# Patient Record
Sex: MALE | Race: WHITE | NOT HISPANIC OR LATINO | Employment: OTHER | URBAN - NONMETROPOLITAN AREA
[De-identification: names, ages, dates, MRNs, and addresses within clinical notes are randomized per-mention and may not be internally consistent; named-entity substitution may affect disease eponyms.]

---

## 2017-02-28 ENCOUNTER — COMMUNICATION - GICH (OUTPATIENT)
Dept: FAMILY MEDICINE | Facility: OTHER | Age: 57
End: 2017-02-28

## 2017-02-28 DIAGNOSIS — R21 RASH AND OTHER NONSPECIFIC SKIN ERUPTION: ICD-10-CM

## 2017-04-26 ENCOUNTER — COMMUNICATION - GICH (OUTPATIENT)
Dept: FAMILY MEDICINE | Facility: OTHER | Age: 57
End: 2017-04-26

## 2017-04-26 DIAGNOSIS — H91.90 HEARING LOSS: ICD-10-CM

## 2017-04-27 ENCOUNTER — COMMUNICATION - GICH (OUTPATIENT)
Dept: FAMILY MEDICINE | Facility: OTHER | Age: 57
End: 2017-04-27

## 2017-04-27 DIAGNOSIS — H91.90 HEARING LOSS: ICD-10-CM

## 2017-06-20 ENCOUNTER — OFFICE VISIT - GICH (OUTPATIENT)
Dept: OTOLARYNGOLOGY | Facility: OTHER | Age: 57
End: 2017-06-20

## 2017-06-20 DIAGNOSIS — Z00.00 ENCOUNTER FOR GENERAL ADULT MEDICAL EXAMINATION WITHOUT ABNORMAL FINDINGS: ICD-10-CM

## 2017-07-17 ENCOUNTER — HISTORY (OUTPATIENT)
Dept: FAMILY MEDICINE | Facility: OTHER | Age: 57
End: 2017-07-17

## 2017-07-17 ENCOUNTER — OFFICE VISIT - GICH (OUTPATIENT)
Dept: FAMILY MEDICINE | Facility: OTHER | Age: 57
End: 2017-07-17

## 2017-07-17 DIAGNOSIS — E78.5 HYPERLIPIDEMIA: ICD-10-CM

## 2017-07-17 DIAGNOSIS — Z00.00 ENCOUNTER FOR GENERAL ADULT MEDICAL EXAMINATION WITHOUT ABNORMAL FINDINGS: ICD-10-CM

## 2017-07-17 DIAGNOSIS — R07.89 OTHER CHEST PAIN: ICD-10-CM

## 2017-07-17 DIAGNOSIS — Z23 ENCOUNTER FOR IMMUNIZATION: ICD-10-CM

## 2017-07-17 DIAGNOSIS — H90.6 MIXED CONDUCTIVE AND SENSORINEURAL HEARING LOSS OF BOTH EARS: ICD-10-CM

## 2017-07-17 LAB
ABSOLUTE BASOPHILS - HISTORICAL: 0.1 THOU/CU MM
ABSOLUTE EOSINOPHILS - HISTORICAL: 0.3 THOU/CU MM
ABSOLUTE IMMATURE GRANULOCYTES(METAS,MYELOS,PROS) - HISTORICAL: 0 THOU/CU MM
ABSOLUTE LYMPHOCYTES - HISTORICAL: 2.5 THOU/CU MM (ref 0.9–2.9)
ABSOLUTE MONOCYTES - HISTORICAL: 0.6 THOU/CU MM
ABSOLUTE NEUTROPHILS - HISTORICAL: 4.8 THOU/CU MM (ref 1.7–7)
ANION GAP - HISTORICAL: 11 (ref 5–18)
BASOPHILS # BLD AUTO: 0.7 %
BILIRUB UR QL: NEGATIVE
BUN SERPL-MCNC: 17 MG/DL (ref 7–25)
BUN/CREAT RATIO - HISTORICAL: 17
CALCIUM SERPL-MCNC: 9.2 MG/DL (ref 8.6–10.3)
CHLORIDE SERPLBLD-SCNC: 102 MMOL/L (ref 98–107)
CLARITY, URINE: CLEAR CLARITY
CO2 SERPL-SCNC: 26 MMOL/L (ref 21–31)
COLOR UR: YELLOW COLOR
CREAT SERPL-MCNC: 1.01 MG/DL (ref 0.7–1.3)
EOSINOPHIL NFR BLD AUTO: 3.6 %
ERYTHROCYTE [DISTWIDTH] IN BLOOD BY AUTOMATED COUNT: 13.2 % (ref 11.5–15.5)
GFR IF NOT AFRICAN AMERICAN - HISTORICAL: >60 ML/MIN/1.73M2
GLUCOSE SERPL-MCNC: 91 MG/DL (ref 70–105)
GLUCOSE URINE: NEGATIVE MG/DL
HCT VFR BLD AUTO: 41.1 % (ref 37–53)
HEMOGLOBIN: 13.9 G/DL (ref 13.5–17.5)
IMMATURE GRANULOCYTES(METAS,MYELOS,PROS) - HISTORICAL: 0.5 %
KETONES UR QL: NEGATIVE MG/DL
LEUKOCYTE ESTERASE URINE: NEGATIVE
LYMPHOCYTES NFR BLD AUTO: 30.2 % (ref 20–44)
MCH RBC QN AUTO: 30.4 PG (ref 26–34)
MCHC RBC AUTO-ENTMCNC: 33.8 G/DL (ref 32–36)
MCV RBC AUTO: 90 FL (ref 80–100)
MONOCYTES NFR BLD AUTO: 6.7 %
NEUTROPHILS NFR BLD AUTO: 58.3 % (ref 42–72)
NITRITE UR QL STRIP: NEGATIVE
OCCULT BLOOD,URINE - HISTORICAL: NEGATIVE
PH UR: 6.5 [PH]
PLATELET # BLD AUTO: 289 THOU/CU MM (ref 140–440)
PMV BLD: 9.7 FL (ref 6.5–11)
POTASSIUM SERPL-SCNC: 3.8 MMOL/L (ref 3.5–5.1)
PROTEIN QUALITATIVE,URINE - HISTORICAL: NEGATIVE MG/DL
RED BLOOD COUNT - HISTORICAL: 4.57 MIL/CU MM (ref 4.3–5.9)
SODIUM SERPL-SCNC: 139 MMOL/L (ref 133–143)
SP GR UR STRIP: 1.02
UROBILINOGEN,QUALITATIVE - HISTORICAL: NORMAL EU/DL
WHITE BLOOD COUNT - HISTORICAL: 8.2 THOU/CU MM (ref 4.5–11)

## 2017-08-08 ENCOUNTER — AMBULATORY - GICH (OUTPATIENT)
Dept: SCHEDULING | Facility: OTHER | Age: 57
End: 2017-08-08

## 2017-08-08 ENCOUNTER — OFFICE VISIT - GICH (OUTPATIENT)
Dept: OTOLARYNGOLOGY | Facility: OTHER | Age: 57
End: 2017-08-08

## 2017-08-08 DIAGNOSIS — Z00.00 ENCOUNTER FOR GENERAL ADULT MEDICAL EXAMINATION WITHOUT ABNORMAL FINDINGS: ICD-10-CM

## 2017-12-28 NOTE — PROGRESS NOTES
Patient Information     Patient Name MRN Sex Ellis Molina [id number] Male 1960      Progress Notes by Selwyn Jacinto MD at 2017  3:45 PM     Author:  Selwyn Jacinto MD Service:  (none) Author Type:  Physician     Filed:  2017  4:53 PM Encounter Date:  2017 Status:  Signed     :  Selwyn Jacinto MD (Physician)            ----------------- PREOPERATIVE EXAM ------------------  2017    SUBJECTIVE:  Ellis Scott is a 56 y.o. male here for preoperative optimization.    I was asked to see Ellis Scott by Dr. Kauffman for  preoperative evaluation due to hyperlipidemia and a history of atypical chest pain.    Date of Surgery: 2017  Type of Surgery: Left her surgery  Surgeon: Dr. Kauffman  Hospital:  [facility]    HPI:  Patient is 56-year-old man who is having ear surgery because of hearing loss. He's been feeling fine. He had a history of atypical chest pain 2 years ago, and workup was negative. He's had no problems at all since then.    He gets lipids checked through his work and for the last 2 years his cholesterol is been under 200.    He feels well and has no new problems or concerns.      Fever/Chills or other infectious symptoms in past month:  (NO)   >10lb weight loss in past two months:  (NO)     Health Care Directive/Code status: Full code status  Hx of blood transfusions:   (NO)   History of VRE/MRSA:  (NO)     Preoperative Evaluation: Obstructive Sleep Apnea screening    S: Snore -  Do you snore loudly? (louder than talking or loud enough to be heard through closed doors)(NO)  T: Tired - Do you often feel tired, fatigued, or sleepy during the daytime?(NO)  O: Observed - Has anyone ever observed you stop breathing during your sleep?(NO)  P: Pressure - Do you have or are you being treated for high blood pressure?(NO)  B: BMI - BMI greater than 35kg/m2?(NO)  A: Age - Age over 50 years old?(YES)  N: Neck - Neck circumference greater than 40  "cm?(NO)  G: Gender - Gender: Male?(YES)    Total number of \"YES\" responses:  2    Scoring: Low risk of OLIMPIA 0-2  At Risk of OLIMPIA: >3 High Risk of OLIMPIA: 5-8        Patient Active Problem List       Diagnosis  Date Noted     Mixed conductive and sensorineural hearing loss, bilateral  05/15/2017     Wheezing  01/28/2013     HYPERLIPIDEMIA       ECZEMA       Chronic          OCULAR MIGRAINES, HX OF       VARICOSE VEIN       Right Leg            Past Medical History:     Diagnosis  Date     Decreased hearing      H/O vasectomy      History of hyperlipidemia      Probable BET        Past Surgical History:      Procedure  Laterality Date     COLONOSCOPY  2009    normal--10 year followup       INNER EAR SURGERY       x several        Umbilical hernia surgery       VASECTOMY         Current Outpatient Prescriptions       Medication  Sig Dispense Refill     triamcinolone (ARISTOCORT) 0.1 % ointment Apply  topically to affected area(s) 4 times daily. 80 g 3     No current facility-administered medications for this visit.      Medications have been reviewed by me and are current to the best of my knowledge and ability.    Recent use of: no recent use of aspirin (ASA), NSAIDS or steroids    Allergies:  No Known Allergies  Latex allergy  no  Immunizations:  Immunization History     Administered  Date(s) Administered     Td (Age >=7 Years) 09/04/2001     Tdap 07/17/2017       Family History       Problem   Relation Age of Onset     Heart Disease  Mother      Other  Other      Parkinson's disease       Other  Other      Parkinson's disease       Heart Disease  Brother      5 vessel bypass       Cancer-prostate  Other        Denies family hx of bleeding tendencies, anesthesia complications, or other problems with surgery.    Social History        Substance Use Topics          Smoking status:   Former Smoker      Types:  Cigarettes, Cigars      Quit date:  9/11/2014      Smokeless tobacco:   Never Used       Comment: Only smokes during " deer season and has 1-2 cigars per year       Alcohol use   1.2 oz/week     1 Cans of beer, 1 Shots of liquor per week         ROS:    Surgical:  patient denies previous complications from prior surgeries including but not limited to prolonged bleeding, anesthesia complications, dysrhythmias, surgical wound infections, or prolonged hospital stay.  Cardiorespiratory: denies chest pain, palpitations, shortness of breath, cough. Most exertion in the past 2 weeks was greater than 4 METs.  Complete ROS otherwise negative except as noted in HPI.     -------------------------------------------------------------    PHYSICAL EXAM:  /80  Pulse 76  Temp 98.4  F (36.9  C) (Temporal)  Ht 1.829 m (6')  Wt 98.9 kg (218 lb)  SpO2 98%  BMI 29.57 kg/m2    EXAM:  General Appearance: Pleasant, alert, appropriate appearance for age. No acute distress  Head Exam: Normal. Normocephalic, atraumatic.  Eyes: PERRL, EOMI  Ears: Normal TM's bilaterally.   OroPharynx: Mucosa pink and moist. Dentition in good repair. No dentures  Neck: Supple, no masses or nodes, no lymphadenopathy.  No thyromegaly.  Lungs: Normal chest wall and respirations. Clear to auscultation, no wheezes or crackles.  Cardiovascular: Regular rate and rhythm. S1, S2, no murmurs.  Gastrointestinal: Soft, nontender, no abnormal masses or organomegaly. BS normal    exam reveals normal penis and testicles, no hernia. Rectal exam reveals normal sphincter tone, empty rectal vault, normal size prostate with no nodules, tenderness, or asymmetry.  Musculoskeletal: No edema. No warm or erythematous joints.  Skin: no concerning or new rashes.  Neurologic Exam: CN 2-12 grossly intact.  Normal gait.  Symmetric DTRs, normal gross motor movement, tone, and coordination. No tremor.  Psychiatric Exam: Alert and oriented, appropriate affect.      EKG:  Personally reviewed by me, done today, and is entirely normal. CBC and basic metabolic panel are  pending  ---------------------------------------------------------------    ASSESSEMENT AND PLAN: Normal preoperative exam. He's had no recurrence of atypical chest pain for 2 years. No contraindication is found to proceeding with his surgery.    Ellis was seen today for preoperative exam.    Diagnoses and all orders for this visit:    Need for diphtheria-tetanus-pertussis (Tdap) vaccine, adult/adolescent  -     OMNI TDAP VACCINE IM  -     OK ADMIN VACC INITIAL    Hyperlipidemia, unspecified hyperlipidemia type  -     URINALYSIS W REFLEX MICROSCOPIC IF POSITIVE; Future  -     CBC WITH DIFFERENTIAL; Future  -     BASIC METABOLIC PANEL; Future  -     URINALYSIS W REFLEX MICROSCOPIC IF POSITIVE  -     CBC WITH DIFFERENTIAL  -     BASIC METABOLIC PANEL  -     CBC WITH AUTO DIFFERENTIAL    Mixed conductive and sensorineural hearing loss, bilateral  -     URINALYSIS W REFLEX MICROSCOPIC IF POSITIVE; Future  -     URINALYSIS W REFLEX MICROSCOPIC IF POSITIVE    PE (physical exam), routine  -     URINALYSIS W REFLEX MICROSCOPIC IF POSITIVE; Future  -     URINALYSIS W REFLEX MICROSCOPIC IF POSITIVE    Atypical chest pain  -     EKG 12 LEAD UNIT PERFORMED  -     CBC WITH DIFFERENTIAL; Future  -     BASIC METABOLIC PANEL; Future  -     CBC WITH DIFFERENTIAL  -     BASIC METABOLIC PANEL  -     CBC WITH AUTO DIFFERENTIAL        PRE OP RECOMMENDATIONS:    No family history of problems with bleeding or anesthesia. Patient is able to tolerate greater than 4 METs of activity without any cardiopulmonary symptoms. ASA PS class 1 . No cardiopulmonary workup is neccessary for the current procedure. Please contact the office with any questions or concerns.    Patient is on chronic pain medications (NO);   Patient is on antiplatlet/anticoagulation (NO)  Other medications that need adjustment perioperatively (NO)    Other:  Patient was advised to call our office and the surgical services with any change in condition or new symptoms if they  were to develop between today and their surgical date; especially any cardiopulmonary symptoms or symptoms concerning for an infection.    Recommendations: Patient may proceed with surgery as scheduled, pending the results of his lab work which I expect will be entirely normal.  Selwyn Jacinto MD ....................  7/17/2017   4:52 PM

## 2017-12-28 NOTE — PROGRESS NOTES
Patient Information     Patient Name MRN Ellis Scott [id number] Male 1960      Progress Notes by Eda Bell at 2017  2:20 PM     Author:  Eda Bell Service:  (none) Author Type:  (none)     Filed:  2017  3:49 PM Encounter Date:  2017 Status:  Signed     :  Eda Bell            See scanned document.

## 2017-12-28 NOTE — PROGRESS NOTES
Patient Information     Patient Name MRN Ellis Scott [id number] Male 1960      Progress Notes by Eda Bell at 2017 11:10 AM     Author:  Eda Bell Service:  (none) Author Type:  (none)     Filed:  2017 12:57 PM Encounter Date:  2017 Status:  Signed     :  Eda Bell            See scanned document.

## 2017-12-29 NOTE — H&P
Patient Information     Patient Name MRN Sex Ellis Molina [id number] Male 1960      H&P by Selwyn Jacinto MD at 2017  3:45 PM     Author:  Selwyn Jacinto MD Service:  (none) Author Type:  Physician     Filed:  2017  4:53 PM Encounter Date:  2017 Status:  Signed     :  Selwyn Jacinto MD (Physician)            ----------------- PREOPERATIVE EXAM ------------------  2017    SUBJECTIVE:  Ellis Scott is a 56 y.o. male here for preoperative optimization.    I was asked to see Ellis Scott by Dr. Kauffman for  preoperative evaluation due to hyperlipidemia and a history of atypical chest pain.    Date of Surgery: 2017  Type of Surgery: Left her surgery  Surgeon: Dr. Kauffman  Hospital:  [facility]    HPI:  Patient is 56-year-old man who is having ear surgery because of hearing loss. He's been feeling fine. He had a history of atypical chest pain 2 years ago, and workup was negative. He's had no problems at all since then.    He gets lipids checked through his work and for the last 2 years his cholesterol is been under 200.    He feels well and has no new problems or concerns.      Fever/Chills or other infectious symptoms in past month:  (NO)   >10lb weight loss in past two months:  (NO)     Health Care Directive/Code status: Full code status  Hx of blood transfusions:   (NO)   History of VRE/MRSA:  (NO)     Preoperative Evaluation: Obstructive Sleep Apnea screening    S: Snore -  Do you snore loudly? (louder than talking or loud enough to be heard through closed doors)(NO)  T: Tired - Do you often feel tired, fatigued, or sleepy during the daytime?(NO)  O: Observed - Has anyone ever observed you stop breathing during your sleep?(NO)  P: Pressure - Do you have or are you being treated for high blood pressure?(NO)  B: BMI - BMI greater than 35kg/m2?(NO)  A: Age - Age over 50 years old?(YES)  N: Neck - Neck circumference greater than 40  "cm?(NO)  G: Gender - Gender: Male?(YES)    Total number of \"YES\" responses:  2    Scoring: Low risk of OLIMPIA 0-2  At Risk of OLIMPIA: >3 High Risk of OLIMPIA: 5-8        Patient Active Problem List       Diagnosis  Date Noted     Mixed conductive and sensorineural hearing loss, bilateral  05/15/2017     Wheezing  01/28/2013     HYPERLIPIDEMIA       ECZEMA       Chronic          OCULAR MIGRAINES, HX OF       VARICOSE VEIN       Right Leg            Past Medical History:     Diagnosis  Date     Decreased hearing      H/O vasectomy      History of hyperlipidemia      Probable BET        Past Surgical History:      Procedure  Laterality Date     COLONOSCOPY  2009    normal--10 year followup       INNER EAR SURGERY       x several        Umbilical hernia surgery       VASECTOMY         Current Outpatient Prescriptions       Medication  Sig Dispense Refill     triamcinolone (ARISTOCORT) 0.1 % ointment Apply  topically to affected area(s) 4 times daily. 80 g 3     No current facility-administered medications for this visit.      Medications have been reviewed by me and are current to the best of my knowledge and ability.    Recent use of: no recent use of aspirin (ASA), NSAIDS or steroids    Allergies:  No Known Allergies  Latex allergy  no  Immunizations:  Immunization History     Administered  Date(s) Administered     Td (Age >=7 Years) 09/04/2001     Tdap 07/17/2017       Family History       Problem   Relation Age of Onset     Heart Disease  Mother      Other  Other      Parkinson's disease       Other  Other      Parkinson's disease       Heart Disease  Brother      5 vessel bypass       Cancer-prostate  Other        Denies family hx of bleeding tendencies, anesthesia complications, or other problems with surgery.    Social History        Substance Use Topics          Smoking status:   Former Smoker      Types:  Cigarettes, Cigars      Quit date:  9/11/2014      Smokeless tobacco:   Never Used       Comment: Only smokes during " deer season and has 1-2 cigars per year       Alcohol use   1.2 oz/week     1 Cans of beer, 1 Shots of liquor per week         ROS:    Surgical:  patient denies previous complications from prior surgeries including but not limited to prolonged bleeding, anesthesia complications, dysrhythmias, surgical wound infections, or prolonged hospital stay.  Cardiorespiratory: denies chest pain, palpitations, shortness of breath, cough. Most exertion in the past 2 weeks was greater than 4 METs.  Complete ROS otherwise negative except as noted in HPI.     -------------------------------------------------------------    PHYSICAL EXAM:  /80  Pulse 76  Temp 98.4  F (36.9  C) (Temporal)  Ht 1.829 m (6')  Wt 98.9 kg (218 lb)  SpO2 98%  BMI 29.57 kg/m2    EXAM:  General Appearance: Pleasant, alert, appropriate appearance for age. No acute distress  Head Exam: Normal. Normocephalic, atraumatic.  Eyes: PERRL, EOMI  Ears: Normal TM's bilaterally.   OroPharynx: Mucosa pink and moist. Dentition in good repair. No dentures  Neck: Supple, no masses or nodes, no lymphadenopathy.  No thyromegaly.  Lungs: Normal chest wall and respirations. Clear to auscultation, no wheezes or crackles.  Cardiovascular: Regular rate and rhythm. S1, S2, no murmurs.  Gastrointestinal: Soft, nontender, no abnormal masses or organomegaly. BS normal    exam reveals normal penis and testicles, no hernia. Rectal exam reveals normal sphincter tone, empty rectal vault, normal size prostate with no nodules, tenderness, or asymmetry.  Musculoskeletal: No edema. No warm or erythematous joints.  Skin: no concerning or new rashes.  Neurologic Exam: CN 2-12 grossly intact.  Normal gait.  Symmetric DTRs, normal gross motor movement, tone, and coordination. No tremor.  Psychiatric Exam: Alert and oriented, appropriate affect.      EKG:  Personally reviewed by me, done today, and is entirely normal. CBC and basic metabolic panel are  pending  ---------------------------------------------------------------    ASSESSEMENT AND PLAN: Normal preoperative exam. He's had no recurrence of atypical chest pain for 2 years. No contraindication is found to proceeding with his surgery.    Ellis was seen today for preoperative exam.    Diagnoses and all orders for this visit:    Need for diphtheria-tetanus-pertussis (Tdap) vaccine, adult/adolescent  -     OMNI TDAP VACCINE IM  -     [place] ADMIN VACC INITIAL    Hyperlipidemia, unspecified hyperlipidemia type  -     URINALYSIS W REFLEX MICROSCOPIC IF POSITIVE; Future  -     CBC WITH DIFFERENTIAL; Future  -     BASIC METABOLIC PANEL; Future  -     URINALYSIS W REFLEX MICROSCOPIC IF POSITIVE  -     CBC WITH DIFFERENTIAL  -     BASIC METABOLIC PANEL  -     CBC WITH AUTO DIFFERENTIAL    Mixed conductive and sensorineural hearing loss, bilateral  -     URINALYSIS W REFLEX MICROSCOPIC IF POSITIVE; Future  -     URINALYSIS W REFLEX MICROSCOPIC IF POSITIVE    PE (physical exam), routine  -     URINALYSIS W REFLEX MICROSCOPIC IF POSITIVE; Future  -     URINALYSIS W REFLEX MICROSCOPIC IF POSITIVE    Atypical chest pain  -     EKG 12 LEAD UNIT PERFORMED  -     CBC WITH DIFFERENTIAL; Future  -     BASIC METABOLIC PANEL; Future  -     CBC WITH DIFFERENTIAL  -     BASIC METABOLIC PANEL  -     CBC WITH AUTO DIFFERENTIAL        PRE OP RECOMMENDATIONS:    No family history of problems with bleeding or anesthesia. Patient is able to tolerate greater than 4 METs of activity without any cardiopulmonary symptoms. ASA PS class 1 . No cardiopulmonary workup is neccessary for the current procedure. Please contact the office with any questions or concerns.    Patient is on chronic pain medications (NO);   Patient is on antiplatlet/anticoagulation (NO)  Other medications that need adjustment perioperatively (NO)    Other:  Patient was advised to call our office and the surgical services with any change in condition or new symptoms if they  were to develop between today and their surgical date; especially any cardiopulmonary symptoms or symptoms concerning for an infection.    Recommendations: Patient may proceed with surgery as scheduled, pending the results of his lab work which I expect will be entirely normal.  Selwyn Jacinto MD ....................  7/17/2017   4:52 PM

## 2017-12-30 NOTE — NURSING NOTE
Patient Information     Patient Name MRN Sex Ellis Molina [id number] Male 1960      Nursing Note by Cate Lord at 2017  3:45 PM     Author:  Cate Lord Service:  (none) Author Type:  (none)     Filed:  2017  3:50 PM Encounter Date:  2017 Status:  Signed     :  Cate Lord            This patient presents today for a Preoperative exam for this procedure: L Ear   Date of Surgery: 17   Surgeon:  Dr Kauffman  Facility:  [facility] ....................  2017   3:37 PM

## 2018-01-03 NOTE — TELEPHONE ENCOUNTER
Patient Information     Patient Name MRN Ellis Scott [id number] Male 1960      Telephone Encounter by Cate Lord at 2017 10:47 AM     Author:  Cate Lord Service:  (none) Author Type:  (none)     Filed:  2017 10:49 AM Encounter Date:  2017 Status:  Signed     :  Cate Lord            Patient states hie rash is back on his hands and knees. He is wondering if he can triamcinolone again? Previous prescription is luana'd up below.    Cate Lord ....................  2017   10:49 AM

## 2018-01-04 NOTE — TELEPHONE ENCOUNTER
Patient Information     Patient Name MRN Ellis Scott [id number] Male 1960      Telephone Encounter by Cate Lord at 2017 10:06 AM     Author:  Cate Lord Service:  (none) Author Type:  (none)     Filed:  2017 10:10 AM Encounter Date:  2017 Status:  Signed     :  Cate Lord            Pt would like a referral to ENT for hearing difficulty. He would like it sent to [facility]. Fax is [phone].  Cate Lord ....................  2017   10:09 AM

## 2018-01-04 NOTE — TELEPHONE ENCOUNTER
Patient Information     Patient Name MRN Ellis Scott [id number] Male 1960      Telephone Encounter by Cate Lord at 2017 10:39 AM     Author:  Cate Lord Service:  (none) Author Type:  (none)     Filed:  2017 10:40 AM Encounter Date:  2017 Status:  Signed     :  Cate Lord            Patient called back. Referral needs to go to audiology not ENT, he apologizes. He would like a call back once referral is placed. Okay to wait until .    Cate Lord ....................  2017   10:39 AM

## 2018-01-04 NOTE — TELEPHONE ENCOUNTER
Patient Information     Patient Name MRN Ellis Scott [id number] Male 1960      Telephone Encounter by Ada Tanner at 2017  2:52 PM     Author:  Ada Tanner Service:  (none) Author Type:  (none)     Filed:  2017  2:52 PM Encounter Date:  2017 Status:  Signed     :  Ada Tanner            Left message notifying patient of referral being placed.  Ada RIVAS CMA 2017

## 2018-01-25 VITALS
OXYGEN SATURATION: 98 % | HEIGHT: 72 IN | DIASTOLIC BLOOD PRESSURE: 80 MMHG | TEMPERATURE: 98.4 F | SYSTOLIC BLOOD PRESSURE: 130 MMHG | WEIGHT: 218 LBS | BODY MASS INDEX: 29.53 KG/M2 | HEART RATE: 76 BPM

## 2018-02-13 ENCOUNTER — DOCUMENTATION ONLY (OUTPATIENT)
Dept: FAMILY MEDICINE | Facility: OTHER | Age: 58
End: 2018-02-13

## 2018-02-13 PROBLEM — I86.8 VARICES OF OTHER SITES: Status: ACTIVE | Noted: 2018-02-13

## 2018-02-13 PROBLEM — L25.9 CONTACT DERMATITIS AND ECZEMA: Status: ACTIVE | Noted: 2018-02-13

## 2018-02-13 PROBLEM — Z87.898 HISTORY OF DISEASE: Status: ACTIVE | Noted: 2018-02-13

## 2018-02-13 PROBLEM — H90.6 MIXED CONDUCTIVE AND SENSORINEURAL HEARING LOSS, BILATERAL: Status: ACTIVE | Noted: 2017-05-15

## 2018-02-13 PROBLEM — E78.5 HYPERLIPIDEMIA: Status: ACTIVE | Noted: 2018-02-13

## 2018-02-13 RX ORDER — TRIAMCINOLONE ACETONIDE 1 MG/G
OINTMENT TOPICAL
COMMUNITY
Start: 2017-02-28 | End: 2018-02-28

## 2018-02-28 DIAGNOSIS — L25.9 CONTACT DERMATITIS AND ECZEMA: Primary | ICD-10-CM

## 2018-03-08 RX ORDER — TRIAMCINOLONE ACETONIDE 1 MG/G
OINTMENT TOPICAL
Qty: 80 G | Refills: 0 | Status: SHIPPED | OUTPATIENT
Start: 2018-03-08 | End: 2020-12-10

## 2018-03-08 NOTE — TELEPHONE ENCOUNTER
Prescription approved per [facility] Refill Protocol. Medication reviewed on 7-17-17.  Judith Tavarez RN on 3/8/2018 at 9:07 AM

## 2018-12-26 ENCOUNTER — HOSPITAL ENCOUNTER (EMERGENCY)
Facility: OTHER | Age: 58
Discharge: ANOTHER HOSPITAL | End: 2018-12-26
Attending: FAMILY MEDICINE | Admitting: FAMILY MEDICINE
Payer: COMMERCIAL

## 2018-12-26 ENCOUNTER — TRANSFERRED RECORDS (OUTPATIENT)
Dept: HEALTH INFORMATION MANAGEMENT | Facility: OTHER | Age: 58
End: 2018-12-26

## 2018-12-26 ENCOUNTER — APPOINTMENT (OUTPATIENT)
Dept: GENERAL RADIOLOGY | Facility: OTHER | Age: 58
End: 2018-12-26
Attending: FAMILY MEDICINE
Payer: COMMERCIAL

## 2018-12-26 VITALS
DIASTOLIC BLOOD PRESSURE: 86 MMHG | TEMPERATURE: 99 F | RESPIRATION RATE: 14 BRPM | SYSTOLIC BLOOD PRESSURE: 139 MMHG | HEART RATE: 77 BPM | BODY MASS INDEX: 28.98 KG/M2 | OXYGEN SATURATION: 95 % | WEIGHT: 207 LBS | HEIGHT: 71 IN

## 2018-12-26 DIAGNOSIS — I21.4 NSTEMI (NON-ST ELEVATED MYOCARDIAL INFARCTION) (H): ICD-10-CM

## 2018-12-26 LAB
ANION GAP SERPL CALCULATED.3IONS-SCNC: 8 MMOL/L (ref 3–14)
APTT PPP: 31 SEC (ref 29–50)
BASOPHILS # BLD AUTO: 0.1 10E9/L (ref 0–0.2)
BASOPHILS NFR BLD AUTO: 0.7 %
BUN SERPL-MCNC: 19 MG/DL (ref 7–25)
CALCIUM SERPL-MCNC: 9.4 MG/DL (ref 8.6–10.3)
CHLORIDE SERPL-SCNC: 103 MMOL/L (ref 98–107)
CO2 SERPL-SCNC: 29 MMOL/L (ref 21–31)
CREAT SERPL-MCNC: 1.02 MG/DL (ref 0.7–1.3)
D DIMER PPP DDU-MCNC: <200 NG/ML D-DU (ref 0–230)
DIFFERENTIAL METHOD BLD: NORMAL
EOSINOPHIL # BLD AUTO: 0.2 10E9/L (ref 0–0.7)
EOSINOPHIL NFR BLD AUTO: 1.8 %
ERYTHROCYTE [DISTWIDTH] IN BLOOD BY AUTOMATED COUNT: 13.1 % (ref 10–15)
GFR SERPL CREATININE-BSD FRML MDRD: 75 ML/MIN/{1.73_M2}
GLUCOSE SERPL-MCNC: 97 MG/DL (ref 70–105)
HCT VFR BLD AUTO: 42.9 % (ref 40–53)
HGB BLD-MCNC: 14.5 G/DL (ref 13.3–17.7)
IMM GRANULOCYTES # BLD: 0 10E9/L (ref 0–0.4)
IMM GRANULOCYTES NFR BLD: 0.4 %
INR PPP: 0.96 (ref 0–1.3)
LYMPHOCYTES # BLD AUTO: 2.1 10E9/L (ref 0.8–5.3)
LYMPHOCYTES NFR BLD AUTO: 26 %
MCH RBC QN AUTO: 30.1 PG (ref 26.5–33)
MCHC RBC AUTO-ENTMCNC: 33.8 G/DL (ref 31.5–36.5)
MCV RBC AUTO: 89 FL (ref 78–100)
MONOCYTES # BLD AUTO: 0.6 10E9/L (ref 0–1.3)
MONOCYTES NFR BLD AUTO: 7.1 %
NEUTROPHILS # BLD AUTO: 5.3 10E9/L (ref 1.6–8.3)
NEUTROPHILS NFR BLD AUTO: 64 %
PLATELET # BLD AUTO: 286 10E9/L (ref 150–450)
POTASSIUM SERPL-SCNC: 4.3 MMOL/L (ref 3.5–5.1)
RBC # BLD AUTO: 4.82 10E12/L (ref 4.4–5.9)
SODIUM SERPL-SCNC: 140 MMOL/L (ref 134–144)
TROPONIN I SERPL-MCNC: 0.24 UG/L (ref 0–0.03)
WBC # BLD AUTO: 8.2 10E9/L (ref 4–11)

## 2018-12-26 PROCEDURE — 96365 THER/PROPH/DIAG IV INF INIT: CPT | Performed by: FAMILY MEDICINE

## 2018-12-26 PROCEDURE — 80048 BASIC METABOLIC PNL TOTAL CA: CPT | Performed by: FAMILY MEDICINE

## 2018-12-26 PROCEDURE — 36415 COLL VENOUS BLD VENIPUNCTURE: CPT | Performed by: FAMILY MEDICINE

## 2018-12-26 PROCEDURE — 99285 EMERGENCY DEPT VISIT HI MDM: CPT | Mod: 25 | Performed by: FAMILY MEDICINE

## 2018-12-26 PROCEDURE — 93005 ELECTROCARDIOGRAM TRACING: CPT | Performed by: FAMILY MEDICINE

## 2018-12-26 PROCEDURE — 99284 EMERGENCY DEPT VISIT MOD MDM: CPT | Mod: Z6 | Performed by: FAMILY MEDICINE

## 2018-12-26 PROCEDURE — 25000128 H RX IP 250 OP 636: Performed by: FAMILY MEDICINE

## 2018-12-26 PROCEDURE — 84484 ASSAY OF TROPONIN QUANT: CPT | Performed by: FAMILY MEDICINE

## 2018-12-26 PROCEDURE — 25000132 ZZH RX MED GY IP 250 OP 250 PS 637: Performed by: FAMILY MEDICINE

## 2018-12-26 PROCEDURE — 71046 X-RAY EXAM CHEST 2 VIEWS: CPT

## 2018-12-26 PROCEDURE — 96376 TX/PRO/DX INJ SAME DRUG ADON: CPT | Performed by: FAMILY MEDICINE

## 2018-12-26 PROCEDURE — 85730 THROMBOPLASTIN TIME PARTIAL: CPT | Performed by: FAMILY MEDICINE

## 2018-12-26 PROCEDURE — 93010 ELECTROCARDIOGRAM REPORT: CPT | Performed by: INTERNAL MEDICINE

## 2018-12-26 PROCEDURE — 85610 PROTHROMBIN TIME: CPT | Performed by: FAMILY MEDICINE

## 2018-12-26 PROCEDURE — 85025 COMPLETE CBC W/AUTO DIFF WBC: CPT | Performed by: FAMILY MEDICINE

## 2018-12-26 PROCEDURE — 85379 FIBRIN DEGRADATION QUANT: CPT | Performed by: FAMILY MEDICINE

## 2018-12-26 RX ORDER — HEPARIN SODIUM 10000 [USP'U]/ML
4000 INJECTION, SOLUTION INTRAVENOUS; SUBCUTANEOUS ONCE
Status: DISCONTINUED | OUTPATIENT
Start: 2018-12-26 | End: 2018-12-26 | Stop reason: HOSPADM

## 2018-12-26 RX ORDER — CLOPIDOGREL BISULFATE 75 MG/1
600 TABLET ORAL ONCE
Status: COMPLETED | OUTPATIENT
Start: 2018-12-26 | End: 2018-12-26

## 2018-12-26 RX ORDER — SODIUM CHLORIDE 9 MG/ML
INJECTION, SOLUTION INTRAVENOUS CONTINUOUS
Status: DISCONTINUED | OUTPATIENT
Start: 2018-12-26 | End: 2018-12-26 | Stop reason: HOSPADM

## 2018-12-26 RX ORDER — ASPIRIN 81 MG/1
324 TABLET, CHEWABLE ORAL ONCE
Status: COMPLETED | OUTPATIENT
Start: 2018-12-26 | End: 2018-12-26

## 2018-12-26 RX ORDER — NALOXONE HYDROCHLORIDE 0.4 MG/ML
.1-.4 INJECTION, SOLUTION INTRAMUSCULAR; INTRAVENOUS; SUBCUTANEOUS
Status: DISCONTINUED | OUTPATIENT
Start: 2018-12-26 | End: 2018-12-26 | Stop reason: HOSPADM

## 2018-12-26 RX ORDER — HEPARIN SODIUM 10000 [USP'U]/100ML
1000 INJECTION, SOLUTION INTRAVENOUS CONTINUOUS PRN
Status: DISCONTINUED | OUTPATIENT
Start: 2018-12-26 | End: 2018-12-26 | Stop reason: HOSPADM

## 2018-12-26 RX ADMIN — SODIUM CHLORIDE: 900 INJECTION, SOLUTION INTRAVENOUS at 12:01

## 2018-12-26 RX ADMIN — CLOPIDOGREL 600 MG: 75 TABLET, FILM COATED ORAL at 12:05

## 2018-12-26 RX ADMIN — ASPIRIN 81 MG 324 MG: 81 TABLET ORAL at 11:31

## 2018-12-26 RX ADMIN — HEPARIN SODIUM 1000 UNITS/HR: 10000 INJECTION, SOLUTION INTRAVENOUS at 12:01

## 2018-12-26 ASSESSMENT — ENCOUNTER SYMPTOMS
CHILLS: 0
APPETITE CHANGE: 0
PALPITATIONS: 0
ACTIVITY CHANGE: 1
FEVER: 0
GASTROINTESTINAL NEGATIVE: 1
MUSCULOSKELETAL NEGATIVE: 1
FATIGUE: 0
WHEEZING: 0
CHEST TIGHTNESS: 1
SHORTNESS OF BREATH: 1
STRIDOR: 0
COUGH: 0

## 2018-12-26 ASSESSMENT — MIFFLIN-ST. JEOR: SCORE: 1771.08

## 2018-12-26 NOTE — ED PROVIDER NOTES
History     Chief Complaint   Patient presents with     Chest Pain     HPI  Ellis Scott is a 58 year old male with history of hyperlipidemia, family history of coronary disease, history of smoking who presented to the ER with chest pain that has been occurring on and off for the last week.  He stated the first episode occurred when he was walking across the ice to go ice fishing.  He became short of breath as well as had some chest pain.  He denied any radiation up into his jaw into his arms but did notice discomfort in his neck.  Does note some discomfort into the his back as well.  He also noticed a little bit shoveling off his car this morning prior to going to work.  He also noted worsening when he was at work when he was lifting things in the cooler and that is when he decided to come in.  He did not have any associated nausea or diaphoresis.  He does not have orthopnea or PND.    He does have a significant family history in that his dad  of an MI at 43 his brother had 5 vessel bypass at age 43.  He also notes that he has increased shortness of breath when he lays on his left side.    Problem List:    Patient Active Problem List    Diagnosis Date Noted     Contact dermatitis and eczema 2018     Priority: Medium     Overview:   Chronic       Hyperlipidemia 2018     Priority: Medium     History of disease 2018     Priority: Medium     Varices of other sites 2018     Priority: Medium     Overview:   Right Leg       Mixed conductive and sensorineural hearing loss, bilateral 05/15/2017     Priority: Medium     Wheezing 2013     Priority: Medium        Past Medical History:    Past Medical History:   Diagnosis Date     Hearing loss      Other thalassemias (CODE)      Personal history of other endocrine, nutritional and metabolic disease      Vasectomy status        Past Surgical History:    Past Surgical History:   Procedure Laterality Date     COLONOSCOPY      ,normal--10  "year followup     OTHER SURGICAL HISTORY      11643,INNER EAR SURGERY, x several     OTHER SURGICAL HISTORY      002872,OTHER     VASECTOMY      No Comments Provided       Family History:    Family History   Problem Relation Age of Onset     Heart Disease Mother         Heart Disease     Other - See Comments Other         Parkinson's disease     Other - See Comments Other         Parkinson's disease     Heart Disease Brother         Heart Disease,5 vessel bypass     Prostate Cancer Other         Cancer-prostate       Social History:  Marital Status:   [2]  Social History     Tobacco Use     Smoking status: Former Smoker     Types: Cigarettes, Cigars     Last attempt to quit: 2014     Years since quittin.2     Smokeless tobacco: Never Used     Tobacco comment: Quit smoking: Only smokes during deer season and has 1-2 cigars per year   Substance Use Topics     Alcohol use: Yes     Alcohol/week: 1.2 oz     Drug use: Unknown     Types: Other     Comment: Drug use: No        Medications:      triamcinolone (KENALOG) 0.1 % ointment         Review of Systems   Constitutional: Positive for activity change. Negative for appetite change, chills, fatigue and fever.   HENT: Negative.    Respiratory: Positive for chest tightness and shortness of breath. Negative for cough, wheezing and stridor.    Cardiovascular: Positive for chest pain. Negative for palpitations and leg swelling.   Gastrointestinal: Negative.    Genitourinary: Negative.    Musculoskeletal: Negative.        Physical Exam   BP: 117/80  Pulse: 77  Heart Rate: 71  Temp: 96.7  F (35.9  C)  Resp: 18  Height: 180.3 cm (5' 11\")  Weight: 93.9 kg (207 lb)  SpO2: 94 %      Physical Exam   Constitutional: He is oriented to person, place, and time. He appears well-developed and well-nourished.   HENT:   Head: Normocephalic and atraumatic.   Eyes: Conjunctivae and EOM are normal. Pupils are equal, round, and reactive to light.   Neck: Normal range of motion. " Neck supple. No JVD present. No thyromegaly present.   Cardiovascular: Normal rate, regular rhythm and normal heart sounds.   Pulmonary/Chest: Effort normal and breath sounds normal.   Abdominal: Soft. Bowel sounds are normal. He exhibits no distension. There is no tenderness. There is no guarding.   Musculoskeletal: Normal range of motion.   Lymphadenopathy:     He has no cervical adenopathy.   Neurological: He is alert and oriented to person, place, and time.   Skin: Skin is warm and dry. Capillary refill takes less than 2 seconds.   Nursing note and vitals reviewed.      ED Course   Patient seen and examined.  Lab work drawn and the saline lock started.  Patient given 4 baby aspirin.  EKG done at 10:41 AM and reviewed no significant ST-T will wave abnormalities are noted.  11:48 AM  Troponin elevated at 0.244- Discussed with Dr Almonte at [facility] and she accepted in transfer. Will call us back regarding Heparin bolus and/or drip. Discussed need for higher level of care with patient and he is agreeable to transfer at this time.    Phone call back from [facility]- advised Heparin Bolus and drip.   Will also order Plavix. Tranport via [facility].     Lipids from 2012- Total 245, HDL 38  and Trig 407       Procedures               EKG Interpretation:      Interpreted by Sofia Castillo  Time reviewed: 10:45 am  Symptoms at time of EKG:None  Rhythm: normal sinus   Rate: normal  Axis: normal  Ectopy: none  Conduction: normal  ST Segments/ T Waves: No ST-T wave changes  Q Waves: none  Comparison to prior: Unchanged from 2017    Clinical Impression: normal EKG    Critical Care time:  none       Results for orders placed or performed during the hospital encounter of 12/26/18 (from the past 24 hour(s))   CBC with platelets differential   Result Value Ref Range    WBC 8.2 4.0 - 11.0 10e9/L    RBC Count 4.82 4.4 - 5.9 10e12/L    Hemoglobin 14.5 13.3 - 17.7 g/dL    Hematocrit 42.9 40.0 - 53.0 %    MCV 89 78 - 100 fl     MCH 30.1 26.5 - 33.0 pg    MCHC 33.8 31.5 - 36.5 g/dL    RDW 13.1 10.0 - 15.0 %    Platelet Count 286 150 - 450 10e9/L    Diff Method Automated Method     % Neutrophils 64.0 %    % Lymphocytes 26.0 %    % Monocytes 7.1 %    % Eosinophils 1.8 %    % Basophils 0.7 %    % Immature Granulocytes 0.4 %    Absolute Neutrophil 5.3 1.6 - 8.3 10e9/L    Absolute Lymphocytes 2.1 0.8 - 5.3 10e9/L    Absolute Monocytes 0.6 0.0 - 1.3 10e9/L    Absolute Eosinophils 0.2 0.0 - 0.7 10e9/L    Absolute Basophils 0.1 0.0 - 0.2 10e9/L    Abs Immature Granulocytes 0.0 0 - 0.4 10e9/L   Basic metabolic panel   Result Value Ref Range    Sodium 140 134 - 144 mmol/L    Potassium 4.3 3.5 - 5.1 mmol/L    Chloride 103 98 - 107 mmol/L    Carbon Dioxide 29 21 - 31 mmol/L    Anion Gap 8 3 - 14 mmol/L    Glucose 97 70 - 105 mg/dL    Urea Nitrogen 19 7 - 25 mg/dL    Creatinine 1.02 0.70 - 1.30 mg/dL    GFR Estimate 75 >60 mL/min/[1.73_m2]    GFR Estimate If Black >90 >60 mL/min/[1.73_m2]    Calcium 9.4 8.6 - 10.3 mg/dL   Troponin I   Result Value Ref Range    Troponin I ES 0.244 (H) 0.000 - 0.034 ug/L   XR Chest 2 Views    Narrative    PROCEDURE:  XR CHEST 2 VW    HISTORY:  CP with SOBOE.     COMPARISON:  7/16/2015    FINDINGS:   The cardiac silhouette is normal in size. The pulmonary vasculature is  normal.  The lungs are clear. No pleural effusion or pneumothorax.      Impression    IMPRESSION:  No acute cardiopulmonary disease.      LATISHA COFFMAN MD       Medications   sodium chloride 0.9% infusion ( Intravenous New Bag 12/26/18 1201)   naloxone (NARCAN) injection 0.1-0.4 mg (not administered)   heparin infusion 25,000 units in D5W 250 mL (1,000 Units/hr Intravenous New Bag 12/26/18 1201)   aspirin (ASA) chewable tablet 324 mg (324 mg Oral Given 12/26/18 1131)   heparin Loading Dose from infusion pump *Give when STARTING heparin infusion 4,000 Units (4,000 Units Intravenous Handoff 12/26/18 1204)   clopidogrel (PLAVIX) tablet 600 mg (600 mg Oral  Given 12/26/18 7423)       Assessments & Plan (with Medical Decision Making)     I have reviewed the nursing notes.    I have reviewed the findings, diagnosis, plan and need for follow up with the patient.   Transfer to [facility]       Medication List      There are no discharge medications for this visit.         Final diagnoses:   NSTEMI (non-ST elevated myocardial infarction) (H)       12/26/2018   [facility]  12/26/18 3891

## 2018-12-26 NOTE — ED TRIAGE NOTES
Patient states that he has been having chest pain with activity which has been going on for about a week now. He states no pain at rest, but rates pain 6/10 with activity.

## 2018-12-27 ENCOUNTER — TRANSFERRED RECORDS (OUTPATIENT)
Dept: HEALTH INFORMATION MANAGEMENT | Facility: OTHER | Age: 58
End: 2018-12-27

## 2018-12-31 ENCOUNTER — TRANSFERRED RECORDS (OUTPATIENT)
Dept: HEALTH INFORMATION MANAGEMENT | Facility: OTHER | Age: 58
End: 2018-12-31

## 2019-01-04 ENCOUNTER — TRANSFERRED RECORDS (OUTPATIENT)
Dept: HEALTH INFORMATION MANAGEMENT | Facility: OTHER | Age: 59
End: 2019-01-04

## 2019-01-07 ENCOUNTER — TRANSFERRED RECORDS (OUTPATIENT)
Dept: HEALTH INFORMATION MANAGEMENT | Facility: OTHER | Age: 59
End: 2019-01-07

## 2019-01-08 ENCOUNTER — MEDICAL CORRESPONDENCE (OUTPATIENT)
Dept: HEALTH INFORMATION MANAGEMENT | Facility: OTHER | Age: 59
End: 2019-01-08

## 2019-01-09 ENCOUNTER — TELEPHONE (OUTPATIENT)
Dept: CARDIAC REHAB | Facility: OTHER | Age: 59
End: 2019-01-09

## 2019-01-10 DIAGNOSIS — Z95.1 S/P CABG (CORONARY ARTERY BYPASS GRAFT): Primary | ICD-10-CM

## 2019-01-16 ENCOUNTER — HOSPITAL ENCOUNTER (OUTPATIENT)
Dept: CARDIAC REHAB | Facility: OTHER | Age: 59
End: 2019-01-16
Payer: COMMERCIAL

## 2019-01-16 VITALS — WEIGHT: 196.8 LBS | BODY MASS INDEX: 27.55 KG/M2 | HEIGHT: 71 IN

## 2019-01-16 DIAGNOSIS — Z95.1 S/P CABG (CORONARY ARTERY BYPASS GRAFT): ICD-10-CM

## 2019-01-16 PROCEDURE — 93798 PHYS/QHP OP CAR RHAB W/ECG: CPT

## 2019-01-16 PROCEDURE — 40000116 ZZH STATISTIC OP CR VISIT

## 2019-01-16 RX ORDER — AMLODIPINE BESYLATE 5 MG/1
TABLET ORAL
Refills: 11 | COMMUNITY
Start: 2019-01-07 | End: 2019-08-12

## 2019-01-16 RX ORDER — NITROGLYCERIN 0.4 MG/1
TABLET SUBLINGUAL
Refills: 1 | COMMUNITY
Start: 2019-01-07 | End: 2020-12-10

## 2019-01-16 RX ORDER — METOPROLOL SUCCINATE 50 MG/1
TABLET, EXTENDED RELEASE ORAL
Refills: 11 | COMMUNITY
Start: 2019-01-07 | End: 2019-08-12

## 2019-01-16 RX ORDER — ATORVASTATIN CALCIUM 80 MG/1
TABLET, FILM COATED ORAL
Refills: 11 | COMMUNITY
Start: 2019-01-07 | End: 2019-05-23

## 2019-01-16 RX ORDER — AMIODARONE HYDROCHLORIDE 200 MG/1
TABLET ORAL
Refills: 1 | COMMUNITY
Start: 2019-01-07 | End: 2019-02-07

## 2019-01-16 RX ORDER — APIXABAN 5 MG/1
TABLET, FILM COATED ORAL
Refills: 0 | COMMUNITY
Start: 2019-01-14 | End: 2019-08-12

## 2019-01-16 RX ORDER — CLOPIDOGREL BISULFATE 75 MG/1
TABLET ORAL
Refills: 11 | COMMUNITY
Start: 2019-01-07 | End: 2019-08-12

## 2019-01-16 ASSESSMENT — 6 MINUTE WALK TEST (6MWT)
MALE CALC: 1959.91
PREDICTED: 1971.86
FEMALE CALC: 1626.44

## 2019-01-16 ASSESSMENT — MIFFLIN-ST. JEOR: SCORE: 1724.81

## 2019-01-16 NOTE — PROGRESS NOTES
" 01/16/19 0908   Session   Session Initial Evaluation and Exercise Prescription   Certified through this date 02/15/19   Cardiac Rehab Assessment   Cardiac Rehab Assessment Pt reports sx of ch pn and SOB several days before coming to ER.  Dx: Nstemi with CABG x 3.  positive fm hx.   Goal:  to return to work within 3 months to active job with [employer].  Goal 2: remain smoke free.  (smoked infrequently).  Goal 3 : keep weight keep weight at 196 or below.  Weighed 207 before surgery.     General Information   Treatment Diagnosis Coronary Artery Bypass Surgery   Date of Treatment Diagnosis 12/31/18   Secondary Treatment Diagnosis NSTEMI  (12/26/18)   Significant Past CV History None   Comorbidities None   Other Medical History (positive fm hx of heart disease. )   Lead up symptoms SOB ch pn    Hospital Location [facility] Discharge Date 01/07/19   Signs and Symptoms Post Hospital Discharge Fatigue   Outpatient Cardiac Rehab Start Date 01/16/19   Primary Physician    Primary Physician Follow Up Scheduled  (2/7/19)   Surgeon    Surgeon Follow Up Scheduled   Cardiologist    Cardiologist Follow Up Scheduled   Ejection Fraction 40-45%   Risk Stratification Moderate   Summary of Cath Report   Summary of Cath Report Available   LAD diffuse disease   D1 \"full of disease\"   Cath Report Comments see report    Living and Work Status    Living Arrangements and Social Status house   Support System Live with an adult   Return to Employment Yes   Occupation works for [employer]   Preventative Medications   CMS recommended medications Ace inhibitors;Anticoagulants;Antiplatelets;Beta Blocker;Lipid Lowering   Fall Risk Screen   Fall screen completed by Cardiac Rehab   Have you fallen 2 or more times in the past year? No   Have you fallen and had an injury in the past year? No   Is patient a fall risk? No   Abuse Screen (yes response referral indicated)   Feels Unsafe at Home or Work/School no   Feels " "Threatened by Someone no   Does Anyone Try to Keep You From Having Contact with Others or Doing Things Outside Your Home? no   Physical Signs of Abuse Present no   Pain   Patient Currently in Pain Denies   Physical Assessments   Incisions WNL   Edema +1 Trace  (LLE)   Right Lung Sounds normal   Left Lung Sounds normal   Limitations Surgical   Individualized Treatment Plan   Monitored Sessions Scheduled 36   Monitored Sessions Attended 1   Oxygen   Supplemental Oxygen needed No   Nutrition Management - Weight Management   Assessment Initial Assessment   Age 58   Weight 89.3 kg (196 lb 12.8 oz)   Height 1.803 m (5' 11\")   BMI (Calculated) 27.51   Initial Rate Your Plate Score. Dietary tool to assess eating patterns. Scores range from 24 to 72. The higher the score the healthier the eating pattern. (given )   Nutrition Management - Lipids   Lipids Labs Not Available   Prescribed Lipid Medication Yes   Statin Intensity High Intensity   Nutrition Management - Diabetes   Diabetes No   Nutrition Management Summary   Dietary Recommendations Low Fat;Low Cholesterol;Low Sodium   Stages of Change for Diet Compliance Preparation   Interventions Planned Attend Nutrition Education Class(es);Educate on Weight Management Principles;Educate on Benefits of Exercise   Interventions In Progress or Completed Understands Weight Management Principles   Patient Goals Goal #1   Goal #1 Description pre surg weight was 207 goal to stay at or below the 196LB   Goal #1 Target Date 02/15/19   Nutrition Summary Comments plans to attend nutruition class   Psychosocial Management   Psychosocial Assessment Initial   Is there history of clinical depression or increased risk of depression? No previous history   Current Level of Stress per Patient Report Denies   Current Coping Skills Has Positive Support System   Initial Patient Health Questionnaire -9 Score (PHQ-9) for depression. 5-9 Minimal symptoms, 10-14 Minor depression, 15-19 Major depression, " moderately severe, > 20 Major depression, severe  3   Initial [facility] COOP Survey score.  Quality of Life:   If total score > 25 review individual areas where patient rated a 4 or 5.  Consider patients current medical condition and what role that plays on the score.   Adjust treatment protocol to improve areas of concern.  Consider the following:  PHQ9 score, DASI, and re-assessment within the next 30 days to assist with developing treatments.  21   Stages of Change Preparation   Interventions Planned Patient denies need for intervention at this time.   Other Core Components - Hypertension   History of or Diagnosis of Hypertension Yes   Currently taking Anti-Hypertensives Beta blocker;Ace Inhibitor   Other Core Components - Tobacco   History of Tobacco Use Yes   Quit Date or Planned Quit Date 12/26/18   Tobacco Use Status Recent (Quit < 6 mo ago)   Tobacco Habit Cigarettes;Cigar   Tobacco Use per Day (average) less than 1. smoked with hunting or fishing    Stages of Change Action   Other Core Components Summary   Interventions Planned Educate on importance of monitoring daily weight;Attend education class(es) on Nutrition;Educate patient regarding action steps for risk factor modification (lifestyle change/medications);Educate on benefits of smoking cessation  (pt states he has quit smoking completely )   Patient Goals Yes   Goal #1 Description remain smoke free   Goal #1 Target Date 04/12/19   Activity/Exercise History   Activity/Exercise Assessment Initial   Activity/Exercise Status prior to event? Was Physically Active   Number of Days Currently participating in Moderate Physical Activity? 0   Number of Days Currently performing  Aerobic Exercise (including rehab)? 0   Number of Minutes per Session Currently of Aerobic Exercise (average)? 0   Current Stage of Change (Physical Activity) Preparation   Current Stage of Change (Aerobic Exercise) Preparation   Patient Goals Goal #1   Goal #1 Description acheive met  level of 5 to 6 by discharge to return to work safely    Goal #1 Target Date 04/12/19   Exercise Assessment   6 Minute Walk Predicted (Male) 1959.91   6 Minute Walk Predicted (Female) 1626.44   Resting HR 69 bpm   Exercise HR 93 bpm   Post Exercise HR 65 bpm   Resting /72   Exercise /62   Post Exercise /60   Pre SpO2 96   While Exercising SpO2 97   Post SpO2 97   Effort Rating 3   Current MET Level 3.1   MET Level Goal 5-6   ECG Rhythm Other (Comment)  (SR with ? PAT )   Ectopy Runs of atrial fibrillation;PACs   Current Symptoms Denies symptoms   Limitations/Restrictions Sternal Precautions   Exercise Prescription   Mode Treadmill;Nustep   Duration/Time 30-45 min   Frequency 3 daysweek   THR (85% of age predicted max HR) 136   OMNI Effort Rating (0-10 Scale) 4-6/10   Progression Progress peak intensity by 1/2 MET per week   Recommended Home Exercise   Type of Exercise Walking   Frequency (days per week) 5   Duration (minutes per session) 15-30 min   Effort Rating Recommended 4-6/10   30 Day Exercise Plan (walk as much at home as possible.  has stationary bike)   Current Home Exercise   Type of Exercise Walking   Frequency (days per week) 5   Duration (minutes per session) 10-15   Follow-up/On-going Support   Provider follow-up needed on the following Other (see comments)  (heart rhythm )   Learning Assessment   Learner Patient   Primary Language English   Preferred Learning Style Reading   Barriers to Learning No barriers noted   Patient Education   Education recommended Medication Overview;Nutrition   Physician cosignature/electronic signature indicates approval of this ITP document. I have established, reviewed and made necessary changes to the individualized treatment plan and exercise prescription for this patient.  The patient's history and clinical status including hemodynamics and ECG were evaluated. The patient was assessed to be stable and appropriate to begin exercise.   The patient's  functional capacity and exercise prescription were determined by the completion of the 13 minutes on TM. See results above. The patient was oriented to the program.  Risk factor profile was completed. Goals and objectives were discussed. CV response was WNL. No symptoms, complaints or pain were reported. Good prognosis for reaching goals below. Skilled therapy is necessary in order to monitor CV response to exercise, to provide education on risk factors and behavior change counseling needed to achieve patient's goals.  Plan to progress to 30-40 minutes of exercise prior to discharge from cardiac rehab.  Initial THR of 20-30 beats above RHR; Effort rating of 4-6. Initiate muscle conditioning as appropriate. Provide risk factor education and behavior change counseling.

## 2019-01-18 ENCOUNTER — HOSPITAL ENCOUNTER (OUTPATIENT)
Dept: CARDIAC REHAB | Facility: OTHER | Age: 59
End: 2019-01-18
Attending: INTERNAL MEDICINE
Payer: COMMERCIAL

## 2019-01-18 PROBLEM — Z95.1 S/P CABG X 3: Status: ACTIVE | Noted: 2019-01-18

## 2019-01-18 PROBLEM — I25.2 HISTORY OF NON-ST ELEVATION MYOCARDIAL INFARCTION (NSTEMI): Status: ACTIVE | Noted: 2019-01-18

## 2019-01-18 PROBLEM — Z79.02 PLATELET INHIBITION DUE TO PLAVIX: Status: ACTIVE | Noted: 2019-01-18

## 2019-01-18 PROBLEM — E78.2 MIXED HYPERLIPIDEMIA: Status: ACTIVE | Noted: 2018-02-13

## 2019-01-18 PROCEDURE — 40000116 ZZH STATISTIC OP CR VISIT

## 2019-01-18 PROCEDURE — 93798 PHYS/QHP OP CAR RHAB W/ECG: CPT

## 2019-01-21 ENCOUNTER — HOSPITAL ENCOUNTER (OUTPATIENT)
Dept: CARDIAC REHAB | Facility: OTHER | Age: 59
End: 2019-01-21
Attending: INTERNAL MEDICINE
Payer: COMMERCIAL

## 2019-01-21 PROCEDURE — 93798 PHYS/QHP OP CAR RHAB W/ECG: CPT

## 2019-01-21 PROCEDURE — 40000116 ZZH STATISTIC OP CR VISIT

## 2019-01-23 ENCOUNTER — HOSPITAL ENCOUNTER (OUTPATIENT)
Dept: CARDIAC REHAB | Facility: OTHER | Age: 59
End: 2019-01-23
Attending: INTERNAL MEDICINE
Payer: COMMERCIAL

## 2019-01-23 PROCEDURE — 40000116 ZZH STATISTIC OP CR VISIT

## 2019-01-23 PROCEDURE — 93798 PHYS/QHP OP CAR RHAB W/ECG: CPT

## 2019-01-23 PROCEDURE — 40000575 ZZH STATISTIC OP CARDIAC VISIT #2

## 2019-01-23 PROCEDURE — 93797 PHYS/QHP OP CAR RHAB WO ECG: CPT

## 2019-01-25 ENCOUNTER — HOSPITAL ENCOUNTER (OUTPATIENT)
Dept: CARDIAC REHAB | Facility: OTHER | Age: 59
End: 2019-01-25
Attending: INTERNAL MEDICINE
Payer: COMMERCIAL

## 2019-01-25 PROCEDURE — 40000116 ZZH STATISTIC OP CR VISIT

## 2019-01-25 PROCEDURE — 93798 PHYS/QHP OP CAR RHAB W/ECG: CPT

## 2019-01-28 ENCOUNTER — HOSPITAL ENCOUNTER (OUTPATIENT)
Dept: CARDIAC REHAB | Facility: OTHER | Age: 59
End: 2019-01-28
Attending: INTERNAL MEDICINE
Payer: COMMERCIAL

## 2019-01-28 PROCEDURE — 40000575 ZZH STATISTIC OP CARDIAC VISIT #2

## 2019-01-28 PROCEDURE — 93798 PHYS/QHP OP CAR RHAB W/ECG: CPT

## 2019-01-28 PROCEDURE — 40000116 ZZH STATISTIC OP CR VISIT

## 2019-01-28 PROCEDURE — 93797 PHYS/QHP OP CAR RHAB WO ECG: CPT

## 2019-01-30 ENCOUNTER — HOSPITAL ENCOUNTER (OUTPATIENT)
Dept: CARDIAC REHAB | Facility: OTHER | Age: 59
End: 2019-01-30
Attending: INTERNAL MEDICINE
Payer: COMMERCIAL

## 2019-01-30 PROCEDURE — 40000116 ZZH STATISTIC OP CR VISIT

## 2019-01-30 PROCEDURE — 93798 PHYS/QHP OP CAR RHAB W/ECG: CPT

## 2019-02-01 ENCOUNTER — HOSPITAL ENCOUNTER (OUTPATIENT)
Dept: CARDIAC REHAB | Facility: OTHER | Age: 59
End: 2019-02-01
Attending: INTERNAL MEDICINE
Payer: COMMERCIAL

## 2019-02-01 PROCEDURE — 93798 PHYS/QHP OP CAR RHAB W/ECG: CPT

## 2019-02-01 PROCEDURE — 40000116 ZZH STATISTIC OP CR VISIT

## 2019-02-04 ENCOUNTER — HOSPITAL ENCOUNTER (OUTPATIENT)
Dept: CARDIAC REHAB | Facility: OTHER | Age: 59
End: 2019-02-04
Attending: INTERNAL MEDICINE
Payer: COMMERCIAL

## 2019-02-04 PROCEDURE — 93798 PHYS/QHP OP CAR RHAB W/ECG: CPT

## 2019-02-04 PROCEDURE — 40000116 ZZH STATISTIC OP CR VISIT

## 2019-02-07 ENCOUNTER — OFFICE VISIT (OUTPATIENT)
Dept: INTERNAL MEDICINE | Facility: OTHER | Age: 59
End: 2019-02-07
Attending: INTERNAL MEDICINE
Payer: COMMERCIAL

## 2019-02-07 VITALS
WEIGHT: 199.4 LBS | DIASTOLIC BLOOD PRESSURE: 64 MMHG | BODY MASS INDEX: 27.81 KG/M2 | SYSTOLIC BLOOD PRESSURE: 96 MMHG | HEART RATE: 64 BPM

## 2019-02-07 DIAGNOSIS — I48.91 POSTOPERATIVE ATRIAL FIBRILLATION (H): ICD-10-CM

## 2019-02-07 DIAGNOSIS — I25.2 HISTORY OF NON-ST ELEVATION MYOCARDIAL INFARCTION (NSTEMI): Primary | ICD-10-CM

## 2019-02-07 DIAGNOSIS — Z95.1 S/P CABG X 3: ICD-10-CM

## 2019-02-07 DIAGNOSIS — E78.2 MIXED HYPERLIPIDEMIA: ICD-10-CM

## 2019-02-07 DIAGNOSIS — R03.1 LOW BLOOD PRESSURE READING: ICD-10-CM

## 2019-02-07 DIAGNOSIS — I97.89 POSTOPERATIVE ATRIAL FIBRILLATION (H): ICD-10-CM

## 2019-02-07 DIAGNOSIS — Z79.02 PLATELET INHIBITION DUE TO PLAVIX: ICD-10-CM

## 2019-02-07 PROCEDURE — 99214 OFFICE O/P EST MOD 30 MIN: CPT | Performed by: INTERNAL MEDICINE

## 2019-02-07 ASSESSMENT — ENCOUNTER SYMPTOMS
PALPITATIONS: 0
COUGH: 0
VOMITING: 0
ABDOMINAL PAIN: 0
CONFUSION: 0
SHORTNESS OF BREATH: 0
LIGHT-HEADEDNESS: 0
DYSURIA: 0
ARTHRALGIAS: 0
NAUSEA: 0
BRUISES/BLEEDS EASILY: 0
CHILLS: 0
FEVER: 0
MYALGIAS: 0
DIZZINESS: 0
DIARRHEA: 0
WOUND: 0
WHEEZING: 0
AGITATION: 0
HEMATURIA: 0

## 2019-02-07 ASSESSMENT — PAIN SCALES - GENERAL: PAINLEVEL: NO PAIN (0)

## 2019-02-07 NOTE — PROGRESS NOTES
Nursing Notes:   Kiersten Agustin LPN  2/7/2019  9:11 AM  Signed  Patient presents today to possibly establish care. Patient had open heart surgery 12/31/18. Patient followed at [facility] yesterday.    Medication Reconciliation Complete    Kiersten Agustin LPN  2/7/2019 8:59 AM  Nursing note reviewed with patient.  Accuracy and completeness verified.   Mr. Scott is a 58 year old male who:  Patient presents with:  Establish Care      ICD-10-CM    1. History of non-ST elevation myocardial infarction (NSTEMI) - 12/26/2018 - Tx to [facility] I25.2    2. Mixed hyperlipidemia E78.2    3. 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 - LIMA-LAD, SVG-OM1, radial artery-diagonal - [facility] Z95.1    4. Platelet inhibition due to Plavix Z79.02    5. Postoperative atrial fibrillation (H) - after CABG I97.89     I48.91    6. Low blood pressure reading R03.1      HPI  Patient is here to establish care.  His previous provider has retired.    Recently had heart attack, stent, and subsequent three-vessel bypass surgery.  Overall doing well.  He is in cardiac rehab.    Had cardiology follow-up yesterday, blood pressures were a little low, they are low again today.  States he is only lightheaded very rarely.  He will continue current dosing of medications but advised reducing amlodipine or even stopping it if he has further low blood pressure problems, lightheadedness issues.    Mixed hyperlipidemia, currently on Lipitor 80 mill grams daily.  Tolerating well.  No side effects reported.    Currently on Eliquis and Plavix due to postoperative atrial fibrillation.  Recent stents.  Overall doing well.  --Surgical scars are healing well.    + doing well with cardiac rehab.     Functional Capacity: > 4 METS.   Reports that he can climb a flight of stairs without any chest pain/heaviness or shortness of breath.   No orthopnea/paroxysmal nocturnal dyspnea  Review of Systems   Constitutional: Negative for chills and fever.   HENT: Negative for  congestion and hearing loss.    Eyes: Negative for visual disturbance.   Respiratory: Negative for cough, shortness of breath and wheezing.    Cardiovascular: Negative for chest pain and palpitations.   Gastrointestinal: Negative for abdominal pain, diarrhea, nausea and vomiting.   Endocrine: Negative for cold intolerance and heat intolerance.   Genitourinary: Negative for dysuria and hematuria.   Musculoskeletal: Negative for arthralgias and myalgias.   Skin: Positive for rash (+ occasionally on abdomen, or arms, or legs - resolves with use of triamcinolone). Negative for wound.   Allergic/Immunologic: Negative for immunocompromised state.   Neurological: Negative for dizziness and light-headedness.   Hematological: Does not bruise/bleed easily.   Psychiatric/Behavioral: Negative for agitation and confusion.        MIKE:   No flowsheet data found.  PHQ9:  No flowsheet data found.    I have personally reviewed the past medical history, past surgical history, medications, allergies, family and social history as listed below.     No Known Allergies    Current Outpatient Medications   Medication Sig Dispense Refill     amLODIPine (NORVASC) 5 MG tablet TK 1 T PO D  11     atorvastatin (LIPITOR) 80 MG tablet TK 1 T PO QHS  11     clopidogrel (PLAVIX) 75 MG tablet TK 1 T PO D  11     ELIQUIS 5 MG tablet TK 1 T PO BID FOR 30 DAYS  0     metoprolol succinate ER (TOPROL-XL) 50 MG 24 hr tablet TK 1 T PO BID  11     nitroGLYcerin (NITROSTAT) 0.4 MG sublingual tablet TK 0.4MG SUBLINGUALLY Q 5 MINUTES X3 DOSES PRN FOR CHEST PAIN.  1     triamcinolone (KENALOG) 0.1 % ointment APPLY EXTERNALLY TO THE AFFECTED AREA FOUR TIMES DAILY 80 g 0        Patient Active Problem List    Diagnosis Date Noted     Postoperative atrial fibrillation (H) - after CABG 02/07/2019     Priority: Medium     Low blood pressure reading 02/07/2019     Priority: Medium     History of non-ST elevation myocardial infarction (NSTEMI) - 12/26/2018 - Tx to .  [facility] 2019     Priority: Medium     S/P CABG x 3 - 2018 - LIMA-LAD, SVG-OM1, radial artery-diagonal - [facility] 2019     Priority: Medium     Platelet inhibition due to Plavix 2019     Priority: Medium     Contact dermatitis and eczema 2018     Priority: Medium     Overview:   Chronic       Mixed hyperlipidemia 2018     Priority: Medium     Varices of other sites 2018     Priority: Medium     Overview:   Right Leg       Mixed conductive and sensorineural hearing loss, bilateral 05/15/2017     Priority: Medium     Wheezing 2013     Priority: Medium     Past Medical History:   Diagnosis Date     Hearing loss     No Comments Provided     Vasectomy status     No Comments Provided     Past Surgical History:   Procedure Laterality Date     COLONOSCOPY      ,normal--10 year followup     OTHER SURGICAL HISTORY      75600,INNER EAR SURGERY, x several     VASECTOMY      No Comments Provided     Social History     Socioeconomic History     Marital status:      Spouse name: None     Number of children: None     Years of education: None     Highest education level: None   Social Needs     Financial resource strain: None     Food insecurity - worry: None     Food insecurity - inability: None     Transportation needs - medical: None     Transportation needs - non-medical: None   Occupational History     None   Tobacco Use     Smoking status: Former Smoker     Types: Cigarettes, Cigars     Last attempt to quit: 2014     Years since quittin.4     Smokeless tobacco: Never Used     Tobacco comment: Quit smoking: Only smokes during deer season and has 1-2 cigars per year   Substance and Sexual Activity     Alcohol use: Yes     Alcohol/week: 1.2 oz     Drug use: Unknown     Types: Other     Comment: Drug use: No     Sexual activity: Yes     Partners: Female     Birth control/protection: Surgical   Other Topics Concern     None   Social History Narrative     ", 2 children in high school.      Works in management in [employer]     Family History   Problem Relation Age of Onset     Heart Disease Mother         Heart Disease     Other - See Comments Other         Parkinson's disease     Other - See Comments Other         Parkinson's disease     Prostate Cancer Other         Cancer-prostate     Heart Disease Brother         Heart Disease,5 vessel bypass       EXAM:   Vitals:    02/07/19 0906   BP: 96/64   Pulse: 64   Weight: 90.4 kg (199 lb 6.4 oz)       Current Pain Score: No Pain (0)     BP Readings from Last 3 Encounters:   02/07/19 96/64   12/26/18 139/86   07/17/17 130/80      Wt Readings from Last 3 Encounters:   02/07/19 90.4 kg (199 lb 6.4 oz)   01/16/19 89.3 kg (196 lb 12.8 oz)   12/26/18 93.9 kg (207 lb)      Estimated body mass index is 27.81 kg/m  as calculated from the following:    Height as of 1/16/19: 1.803 m (5' 11\").    Weight as of this encounter: 90.4 kg (199 lb 6.4 oz).     Physical Exam   Constitutional: He appears well-developed and well-nourished. No distress.   HENT:   Head: Normocephalic and atraumatic.   Eyes: Conjunctivae and EOM are normal. No scleral icterus.   Neck: No thyromegaly present.   Cardiovascular: Normal rate and regular rhythm.   Pulmonary/Chest: Effort normal. No respiratory distress. He has no wheezes.   Abdominal: Soft. There is no tenderness.   Musculoskeletal: He exhibits no tenderness or deformity.   Lymphadenopathy:     He has no cervical adenopathy.   Neurological: He is alert. No cranial nerve deficit.   Skin: Skin is warm and dry.   + left forearm radial artery excision scar noted.   Sternal incision noted.    Psychiatric: He has a normal mood and affect.      Procedures   INVESTIGATIONS:  Results for orders placed or performed during the hospital encounter of 12/26/18   XR Chest 2 Views    Narrative    PROCEDURE:  XR CHEST 2 VW    HISTORY:  CP with SOBOE.     COMPARISON:  7/16/2015    FINDINGS:   The cardiac " silhouette is normal in size. The pulmonary vasculature is  normal.  The lungs are clear. No pleural effusion or pneumothorax.      Impression    IMPRESSION:  No acute cardiopulmonary disease.      LATISHA COFFMAN MD   CBC with platelets differential   Result Value Ref Range    WBC 8.2 4.0 - 11.0 10e9/L    RBC Count 4.82 4.4 - 5.9 10e12/L    Hemoglobin 14.5 13.3 - 17.7 g/dL    Hematocrit 42.9 40.0 - 53.0 %    MCV 89 78 - 100 fl    MCH 30.1 26.5 - 33.0 pg    MCHC 33.8 31.5 - 36.5 g/dL    RDW 13.1 10.0 - 15.0 %    Platelet Count 286 150 - 450 10e9/L    Diff Method Automated Method     % Neutrophils 64.0 %    % Lymphocytes 26.0 %    % Monocytes 7.1 %    % Eosinophils 1.8 %    % Basophils 0.7 %    % Immature Granulocytes 0.4 %    Absolute Neutrophil 5.3 1.6 - 8.3 10e9/L    Absolute Lymphocytes 2.1 0.8 - 5.3 10e9/L    Absolute Monocytes 0.6 0.0 - 1.3 10e9/L    Absolute Eosinophils 0.2 0.0 - 0.7 10e9/L    Absolute Basophils 0.1 0.0 - 0.2 10e9/L    Abs Immature Granulocytes 0.0 0 - 0.4 10e9/L   Basic metabolic panel   Result Value Ref Range    Sodium 140 134 - 144 mmol/L    Potassium 4.3 3.5 - 5.1 mmol/L    Chloride 103 98 - 107 mmol/L    Carbon Dioxide 29 21 - 31 mmol/L    Anion Gap 8 3 - 14 mmol/L    Glucose 97 70 - 105 mg/dL    Urea Nitrogen 19 7 - 25 mg/dL    Creatinine 1.02 0.70 - 1.30 mg/dL    GFR Estimate 75 >60 mL/min/[1.73_m2]    GFR Estimate If Black >90 >60 mL/min/[1.73_m2]    Calcium 9.4 8.6 - 10.3 mg/dL   Troponin I   Result Value Ref Range    Troponin I ES 0.244 (H) 0.000 - 0.034 ug/L   INR   Result Value Ref Range    INR 0.96 0 - 1.3   Partial thromboplastin time   Result Value Ref Range    PTT 31 29 - 50 sec   D-Dimer (HI,GH)   Result Value Ref Range    D-Dimer ng/mL <200 0 - 230 ng/ml D-DU   EKG 12-lead, tracing only    Impression    Normal EKG with a rate of 75.  Radu Rivers MD         ASSESSMENT AND PLAN:  Problem List Items Addressed This Visit        Endocrine    Mixed hyperlipidemia        Circulatory    Postoperative atrial fibrillation (H) - after CABG    Low blood pressure reading       Hematologic    Platelet inhibition due to Plavix       Other    History of non-ST elevation myocardial infarction (NSTEMI) - 12/26/2018 - Tx to [facility] - Primary    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 - LIMA-LAD, SVG-OM1, radial artery-diagonal - [facility]        reviewed diet, exercise and weight control, recommended sodium restriction, cardiovascular risk and specific lipid/LDL goals reviewed, use of aspirin to prevent MI and TIA's discussed  -- Expected clinical course discussed    -- Medications and their side effects discussed    25 minutes spent in face-to-face interaction with patient (separate from separately billed procedures) with greater than 50% spent in counseling and care coordination of listed medical problems.      Patient Instructions   Blood pressures are marginally low.  If you have more lightheaded and dizzy spells, would recommend stopping the amlodipine/Norvasc.    Colonoscopy was normal in 2009 --would consider waiting at least one year from now to repeat colonoscopy or we could consider doing the colon blood stool testing kit at home. Cologuard.     -- Try a Super-B-Complex with B12 -- every other day -- to help energy / mood and balance.     -- Consider Under the tongue / Liquid. ([employer])    Vitamin D3 is a safe and effective product for the treatment and prevention of osteoporosis, rickets and vitamin D deficiency. Most people living in this part of the northern [place] are vitamin D deficient.    -- Start taking 2000 IUvitamin D3 daily with food as it is a fat soluble vitamin.     Vitamin D3 is associated with improved immunity, improved bone health, improved mood and in some people a reduction in pain severity.       Return in approximately 1 year, or sooner as needed for follow-up with Dr. Jerez.  - Annual Follow-up / Physical    Clinic : [phone]  Appointment line:  [phone]      Vladislav Jerez MD  Internal Medicine  [facility]     Portions of this note were dictated using speech recognition software. The note has been proofread but errors in the text may have been overlooked. Please contact me if there are any concerns regarding the accuracy of the dictation.

## 2019-02-07 NOTE — PATIENT INSTRUCTIONS
Blood pressures are marginally low.  If you have more lightheaded and dizzy spells, would recommend stopping the amlodipine/Norvasc.    Colonoscopy was normal in 2009 --would consider waiting at least one year from now to repeat colonoscopy or we could consider doing the colon blood stool testing kit at home. Cologuard.     -- Try a Super-B-Complex with B12 -- every other day -- to help energy / mood and balance.     -- Consider Under the tongue / Liquid. ([employer])    Vitamin D3 is a safe and effective product for the treatment and prevention of osteoporosis, rickets and vitamin D deficiency. Most people living in this part of the northern hemisphere are vitamin D deficient.    -- Start taking 2000 IUvitamin D3 daily with food as it is a fat soluble vitamin.     Vitamin D3 is associated with improved immunity, improved bone health, improved mood and in some people a reduction in pain severity.       Return in approximately 1 year, or sooner as needed for follow-up with Dr. Jerez.  - Annual Follow-up / Physical    Clinic : [phone]  Appointment line: [phone]

## 2019-02-07 NOTE — NURSING NOTE
Patient presents today to possibly establish care. Patient had open heart surgery 12/31/18. Patient followed at [facility] yesterday.    Medication Reconciliation Complete    Kiersten Agustin LPN  2/7/2019 8:59 AM

## 2019-02-08 ENCOUNTER — HOSPITAL ENCOUNTER (OUTPATIENT)
Dept: CARDIAC REHAB | Facility: OTHER | Age: 59
End: 2019-02-08
Attending: INTERNAL MEDICINE
Payer: COMMERCIAL

## 2019-02-08 PROCEDURE — 93798 PHYS/QHP OP CAR RHAB W/ECG: CPT

## 2019-02-08 PROCEDURE — 40000116 ZZH STATISTIC OP CR VISIT

## 2019-02-11 ENCOUNTER — HOSPITAL ENCOUNTER (OUTPATIENT)
Dept: CARDIAC REHAB | Facility: OTHER | Age: 59
End: 2019-02-11
Attending: INTERNAL MEDICINE
Payer: COMMERCIAL

## 2019-02-11 PROCEDURE — 93798 PHYS/QHP OP CAR RHAB W/ECG: CPT

## 2019-02-11 PROCEDURE — 40000116 ZZH STATISTIC OP CR VISIT

## 2019-02-13 ENCOUNTER — HOSPITAL ENCOUNTER (OUTPATIENT)
Dept: CARDIAC REHAB | Facility: OTHER | Age: 59
End: 2019-02-13
Attending: INTERNAL MEDICINE
Payer: COMMERCIAL

## 2019-02-13 VITALS — BODY MASS INDEX: 27.44 KG/M2 | WEIGHT: 196 LBS | HEIGHT: 71 IN

## 2019-02-13 PROCEDURE — 93798 PHYS/QHP OP CAR RHAB W/ECG: CPT

## 2019-02-13 PROCEDURE — 40000116 ZZH STATISTIC OP CR VISIT

## 2019-02-13 ASSESSMENT — 6 MINUTE WALK TEST (6MWT)
MALE CALC: 1961
FEMALE CALC: 1628.89
PREDICTED: 1972.96

## 2019-02-13 ASSESSMENT — MIFFLIN-ST. JEOR: SCORE: 1720.92

## 2019-02-13 NOTE — PROGRESS NOTES
" 02/13/19 0800   Session   Session 30 Day Individualized Treatment Plan   Certified through this date 03/15/19   Cardiac Rehab Assessment   Cardiac Rehab Assessment 2/13/19: Pt doing very well in cardiac rehab.  Has met his MET level goal, currently exercising at 5.5-6.0.  No longer having incisional pain.  HR and BP responses normal.  Goal #1-pt has a return to work date of March 25 due to weight restrictions.  Goal #2-pt has remained smoke free and states it has been easy for him since he didnt smoke too often.  Goal #3-Pt's weight has remained stable at 195.      Pt reports sx of ch pn and SOB several days before coming to ER.  Dx: Nstemi with CABG x 3.  positive fm hx.   Goal:  to return to work within 3 months to active job with [employer].  Goal 2: remain smoke free.  (smoked infrequently).  Goal 3 : keep weight keep weight at 196 or below.  Weighed 207 before surgery. Goal 4 attain met level of 5 to 6 by discharge.      General Information   Treatment Diagnosis Coronary Artery Bypass Surgery   Date of Treatment Diagnosis 12/31/18   Secondary Treatment Diagnosis NSTEMI  (12/26/18)   Significant Past CV History None   Comorbidities None   Other Medical History (positive fm hx of heart disease. )   Lead up symptoms SOB ch pn    Hospital Location [facility]   Hospital Discharge Date 01/07/19   Signs and Symptoms Post Hospital Discharge Fatigue   Outpatient Cardiac Rehab Start Date 01/16/19   Primary Physician    Primary Physician Follow Up Completed  (2/7/19)   Surgeon    Surgeon Follow Up Completed   Cardiologist    Cardiologist Follow Up Scheduled   Ejection Fraction 40-45%   Risk Stratification Moderate   Summary of Cath Report   Summary of Cath Report Available   LAD diffuse disease   D1 \"full of disease\"   Cath Report Comments see report    Living and Work Status    Living Arrangements and Social Status house   Support System Live with an adult   Return to Employment Yes " "  Occupation works for [employer]   Preventative Medications   CMS recommended medications Ace inhibitors;Anticoagulants;Antiplatelets;Beta Blocker;Lipid Lowering   Fall Risk Screen   Fall screen completed by Cardiac Rehab   Have you fallen 2 or more times in the past year? No   Have you fallen and had an injury in the past year? No   Is patient a fall risk? No   Pain   Patient Currently in Pain Denies   Physical Assessments   Incisions WNL   Edema None   Right Lung Sounds not assessed   Left Lung Sounds not assessed   Limitations Surgical   Individualized Treatment Plan   Monitored Sessions Scheduled 36   Monitored Sessions Attended 12   Oxygen   Supplemental Oxygen needed No   Nutrition Management - Weight Management   Assessment Re-assessment   Age 58   Weight 88.9 kg (196 lb)   Height 1.803 m (5' 10.98\")   BMI (Calculated) 27.41   Initial Rate Your Plate Score. Dietary tool to assess eating patterns. Scores range from 24 to 72. The higher the score the healthier the eating pattern. 31   Nutrition Management - Lipids   Lipids Labs Not Available   Prescribed Lipid Medication Yes   Statin Intensity High Intensity   Nutrition Management - Diabetes   Diabetes No   Nutrition Management Summary   Dietary Recommendations Low Fat;Low Cholesterol;Low Sodium   Stages of Change for Diet Compliance Preparation   Interventions Planned Attend Nutrition Education Class(es);Educate on Weight Management Principles;Educate on Benefits of Exercise   Interventions In Progress or Completed Understands Weight Management Principles;Attended Nutrition Class(es)   Patient Goals Goal #1   Goal #1 Description pre surg weight was 207 goal to stay at or below the 196LB   Goal #1 Target Date 02/15/19   Goal #1 Date Met 02/13/19   Goal #1 Progress Towards Goal Met   Psychosocial Management   Psychosocial Assessment Re-assessment   Is there history of clinical depression or increased risk of depression? No previous history   Current Level of Stress " per Patient Report Denies   Current Coping Skills Has Positive Support System   Initial Patient Health Questionnaire -9 Score (PHQ-9) for depression. 5-9 Minimal symptoms, 10-14 Minor depression, 15-19 Major depression, moderately severe, > 20 Major depression, severe  3   Initial [facility] Survey score.  Quality of Life:   If total score > 25 review individual areas where patient rated a 4 or 5.  Consider patients current medical condition and what role that plays on the score.   Adjust treatment protocol to improve areas of concern.  Consider the following:  PHQ9 score, DASI, and re-assessment within the next 30 days to assist with developing treatments.  21   Stages of Change Preparation   Interventions Planned Patient denies need for intervention at this time.   Other Core Components - Hypertension   History of or Diagnosis of Hypertension Yes   Currently taking Anti-Hypertensives Beta blocker;Ace Inhibitor   Other Core Components - Tobacco   History of Tobacco Use Yes   Quit Date or Planned Quit Date 12/26/18   Tobacco Use Status Recent (Quit < 6 mo ago)   Tobacco Habit Cigarettes;Cigar   Tobacco Use per Day (average) less than 1. smoked with hunting or fishing    Stages of Change Action   Other Core Components Summary   Interventions Planned Educate on importance of monitoring daily weight;Attend education class(es) on Nutrition;Educate patient regarding action steps for risk factor modification (lifestyle change/medications);Educate on benefits of smoking cessation  (pt states he has quit smoking completely )   Interventions In Progress or Completed Educated on importance of maintaining low sodium diet   Patient Goals Yes   Goal #1 Description remain smoke free   Goal #1 Target Date 04/12/19   Goal #1 Date Met 02/13/19   Goal #1 Progress Towards Goal abstaining   Activity/Exercise History   Activity/Exercise Assessment Re-assessment   Activity/Exercise Status prior to event? Was Physically Active    Number of Days Currently participating in Moderate Physical Activity? 7   Number of Days Currently performing  Aerobic Exercise (including rehab)? 6   Number of Minutes per Session Currently of Aerobic Exercise (average)? 30-60   Current Stage of Change (Physical Activity) Action   Current Stage of Change (Aerobic Exercise) Action   Patient Goals Goal #3   Goal #1 Description acheive met level of 5 to 6 by discharge to return to work safely    Goal #1 Target Date 04/12/19   Goal #1 Date Met 02/13/19   Goal #1 Progress Towards Goal met   Goal #3 Progress Towards Goal Exercising at 6.0 METS   Exercise Assessment   6 Minute Walk Predicted (Male) 1961   6 Minute Walk Predicted (Female) 1628.89   Resting HR 64 bpm   Exercise HR 97 bpm   Post Exercise HR 70 bpm   Resting /50   Exercise /50   Post Exercise /72   Effort Rating 4   Current MET Level 6.0   MET Level Goal 5-6   ECG Rhythm Other (Comment)  (SR with ? PAT )   Ectopy PVCs   Current Symptoms Denies symptoms   Limitations/Restrictions Sternal Precautions   Exercise Prescription   Mode Treadmill;Nustep   Duration/Time 45-60 min   Frequency 3 daysweek   THR (85% of age predicted max HR) 136   OMNI Effort Rating (0-10 Scale) 4-6/10   Progression Progress peak intensity by 1/2 MET per week   Recommended Home Exercise   Type of Exercise Bike   Frequency (days per week) 3-4   Duration (minutes per session) 30-45 min   Effort Rating Recommended 4-6/10   30 Day Exercise Plan (same)   Current Home Exercise   Type of Exercise Bike   Frequency (days per week) 3-4   Duration (minutes per session) 30-45   Learning Assessment   Learner Patient   Primary Language English   Preferred Learning Style Reading   Barriers to Learning No barriers noted   Patient Education   Education recommended Medication Overview;Nutrition   Education classes attended Nutrition;Stress Management   Physician cosignature/electronic signature indicates approval of this ITP document. I  have established, reviewed and made necessary changes to the individualized treatment plan and exercise prescription for this patient.

## 2019-02-15 ENCOUNTER — HOSPITAL ENCOUNTER (OUTPATIENT)
Dept: CARDIAC REHAB | Facility: OTHER | Age: 59
End: 2019-02-15
Attending: INTERNAL MEDICINE
Payer: COMMERCIAL

## 2019-02-15 PROCEDURE — 93798 PHYS/QHP OP CAR RHAB W/ECG: CPT

## 2019-02-15 PROCEDURE — 40000116 ZZH STATISTIC OP CR VISIT

## 2019-02-18 ENCOUNTER — HOSPITAL ENCOUNTER (OUTPATIENT)
Dept: CARDIAC REHAB | Facility: OTHER | Age: 59
End: 2019-02-18
Attending: INTERNAL MEDICINE
Payer: COMMERCIAL

## 2019-02-18 PROCEDURE — 40000116 ZZH STATISTIC OP CR VISIT

## 2019-02-18 PROCEDURE — 93798 PHYS/QHP OP CAR RHAB W/ECG: CPT

## 2019-02-20 ENCOUNTER — HOSPITAL ENCOUNTER (OUTPATIENT)
Dept: CARDIAC REHAB | Facility: OTHER | Age: 59
End: 2019-02-20
Attending: INTERNAL MEDICINE
Payer: COMMERCIAL

## 2019-02-20 PROCEDURE — 93797 PHYS/QHP OP CAR RHAB WO ECG: CPT

## 2019-02-20 PROCEDURE — 40000575 ZZH STATISTIC OP CARDIAC VISIT #2

## 2019-02-20 PROCEDURE — 93798 PHYS/QHP OP CAR RHAB W/ECG: CPT

## 2019-02-20 PROCEDURE — 40000116 ZZH STATISTIC OP CR VISIT

## 2019-02-22 ENCOUNTER — HOSPITAL ENCOUNTER (OUTPATIENT)
Dept: CARDIAC REHAB | Facility: OTHER | Age: 59
End: 2019-02-22
Attending: INTERNAL MEDICINE
Payer: COMMERCIAL

## 2019-02-22 PROCEDURE — 40000116 ZZH STATISTIC OP CR VISIT

## 2019-02-22 PROCEDURE — 93798 PHYS/QHP OP CAR RHAB W/ECG: CPT

## 2019-02-25 ENCOUNTER — HOSPITAL ENCOUNTER (OUTPATIENT)
Dept: CARDIAC REHAB | Facility: OTHER | Age: 59
End: 2019-02-25
Attending: INTERNAL MEDICINE
Payer: COMMERCIAL

## 2019-02-25 PROCEDURE — 93798 PHYS/QHP OP CAR RHAB W/ECG: CPT

## 2019-02-25 PROCEDURE — 40000116 ZZH STATISTIC OP CR VISIT

## 2019-02-27 ENCOUNTER — HOSPITAL ENCOUNTER (OUTPATIENT)
Dept: CARDIAC REHAB | Facility: OTHER | Age: 59
End: 2019-02-27
Attending: INTERNAL MEDICINE
Payer: COMMERCIAL

## 2019-02-27 PROCEDURE — 40000116 ZZH STATISTIC OP CR VISIT

## 2019-02-27 PROCEDURE — 93798 PHYS/QHP OP CAR RHAB W/ECG: CPT

## 2019-03-01 ENCOUNTER — HOSPITAL ENCOUNTER (OUTPATIENT)
Dept: CARDIAC REHAB | Facility: OTHER | Age: 59
End: 2019-03-01
Attending: INTERNAL MEDICINE
Payer: COMMERCIAL

## 2019-03-01 PROCEDURE — 40000116 ZZH STATISTIC OP CR VISIT

## 2019-03-01 PROCEDURE — 93798 PHYS/QHP OP CAR RHAB W/ECG: CPT

## 2019-03-04 ENCOUNTER — HOSPITAL ENCOUNTER (OUTPATIENT)
Dept: CARDIAC REHAB | Facility: OTHER | Age: 59
End: 2019-03-04
Attending: INTERNAL MEDICINE
Payer: COMMERCIAL

## 2019-03-04 PROCEDURE — 93797 PHYS/QHP OP CAR RHAB WO ECG: CPT

## 2019-03-04 PROCEDURE — 93798 PHYS/QHP OP CAR RHAB W/ECG: CPT

## 2019-03-04 PROCEDURE — 40000575 ZZH STATISTIC OP CARDIAC VISIT #2

## 2019-03-04 PROCEDURE — 40000116 ZZH STATISTIC OP CR VISIT

## 2019-03-06 ENCOUNTER — HOSPITAL ENCOUNTER (OUTPATIENT)
Dept: CARDIAC REHAB | Facility: OTHER | Age: 59
End: 2019-03-06
Attending: INTERNAL MEDICINE
Payer: COMMERCIAL

## 2019-03-06 PROCEDURE — 93798 PHYS/QHP OP CAR RHAB W/ECG: CPT

## 2019-03-06 PROCEDURE — 40000116 ZZH STATISTIC OP CR VISIT

## 2019-03-08 ENCOUNTER — HOSPITAL ENCOUNTER (OUTPATIENT)
Dept: CARDIAC REHAB | Facility: OTHER | Age: 59
End: 2019-03-08
Attending: INTERNAL MEDICINE
Payer: COMMERCIAL

## 2019-03-08 PROCEDURE — 93798 PHYS/QHP OP CAR RHAB W/ECG: CPT

## 2019-03-08 PROCEDURE — 40000116 ZZH STATISTIC OP CR VISIT

## 2019-03-11 ENCOUNTER — HOSPITAL ENCOUNTER (OUTPATIENT)
Dept: CARDIAC REHAB | Facility: OTHER | Age: 59
End: 2019-03-11
Attending: INTERNAL MEDICINE
Payer: COMMERCIAL

## 2019-03-11 ENCOUNTER — TELEPHONE (OUTPATIENT)
Dept: INTERNAL MEDICINE | Facility: OTHER | Age: 59
End: 2019-03-11

## 2019-03-11 PROCEDURE — 40000116 ZZH STATISTIC OP CR VISIT

## 2019-03-11 PROCEDURE — 93798 PHYS/QHP OP CAR RHAB W/ECG: CPT

## 2019-03-11 NOTE — TELEPHONE ENCOUNTER
Should get approval from his cardiology team. Should call his cardiology clinic for recommendations.     Vladislav Jerez MD

## 2019-03-11 NOTE — TELEPHONE ENCOUNTER
Patient plans on going back to work on 3-25-19, patient isn't able to see the Cardiologist until 4-22-19.  Patient is wondering if Vladislav Jerez MD can give him the clearance to return to work.  Patient is available this week only if provider needs an office visit.      Priscilla Chen LPN 3/11/2019 11:22 AM

## 2019-03-13 ENCOUNTER — HOSPITAL ENCOUNTER (OUTPATIENT)
Dept: CARDIAC REHAB | Facility: OTHER | Age: 59
End: 2019-03-13
Attending: INTERNAL MEDICINE
Payer: COMMERCIAL

## 2019-03-13 VITALS — WEIGHT: 198 LBS | BODY MASS INDEX: 27.63 KG/M2

## 2019-03-13 PROCEDURE — 40000116 ZZH STATISTIC OP CR VISIT

## 2019-03-13 PROCEDURE — 93798 PHYS/QHP OP CAR RHAB W/ECG: CPT

## 2019-03-13 NOTE — PROGRESS NOTES
03/13/19 0800   Session   Session 60 Day Individualized Treatment Plan   Certified through this date 04/12/19   Cardiac Rehab Assessment   Cardiac Rehab Assessment 3/13/2019 Patient has been increasing exercise and has met his 6.0 MET level Goal#2 has totally quit smoking Goal#3 need continued vigilance with portion control to decrease weight and obtain goal weight of 185#  Has not has pain and has no other questions concerns or issues to be addressed2/13/19: Pt doing very well in cardiac rehab.  Has met his MET level goal, currently exercising at 5.5-6.0.  No longer having incisional pain.  HR and BP responses normal.  Goal #1-pt has a return to work date of March 25 due to weight restrictions.  Goal #2-pt has remained smoke free and states it has been easy for him since he didnt smoke too often.  Goal #3-Pt's weight has remained stable at 195.  Pt reports sx of ch pn and SOB several days before coming to ER.  Dx: Nstemi with CABG x 3.  positive fm hx.   Goal:  to return to work within 3 months to active job with [employer].  Goal 2: remain smoke free.  (smoked infrequently).  Goal 3 : keep weight keep weight at 196 or below.  Weighed 207 before surgery. Goal 4 attain met level of 5 to 6 by discharge.      General Information   Treatment Diagnosis Coronary Artery Bypass Surgery   Date of Treatment Diagnosis 12/31/18   Secondary Treatment Diagnosis NSTEMI  (12/26/18)   Significant Past CV History None   Comorbidities None   Other Medical History (positive fm hx of heart disease. )   Lead up symptoms SOB ch pn    Hospital Location [facility] Discharge Date 01/07/19   Signs and Symptoms Post Hospital Discharge Fatigue   Outpatient Cardiac Rehab Start Date 01/16/19   Primary Physician    Primary Physician Follow Up Completed  (2/7/19)   Surgeon    Surgeon Follow Up Completed   Cardiologist    Cardiologist Follow Up Scheduled   Ejection Fraction 40-45%   Risk Stratification  "Moderate   Summary of Cath Report   Summary of Cath Report Available   LAD diffuse disease   D1 \"full of disease\"   Cath Report Comments see report    Living and Work Status    Living Arrangements and Social Status house   Support System Live with an adult   Return to Employment Yes   Occupation works for [employer]   Preventative Medications   CMS recommended medications Ace inhibitors;Anticoagulants;Antiplatelets;Beta Blocker;Lipid Lowering   Fall Risk Screen   Fall screen completed by Cardiac Rehab   Have you fallen 2 or more times in the past year? No   Have you fallen and had an injury in the past year? No   Is patient a fall risk? No   Pain   Patient Currently in Pain Denies   Physical Assessments   Incisions WNL   Edema None   Right Lung Sounds not assessed   Left Lung Sounds not assessed   Limitations Surgical   Individualized Treatment Plan   Monitored Sessions Scheduled 36   Monitored Sessions Attended 24   Oxygen   Supplemental Oxygen needed No   Nutrition Management - Weight Management   Assessment Re-assessment   Age 58   Weight 89.8 kg (198 lb)   Initial Rate Your Plate Score. Dietary tool to assess eating patterns. Scores range from 24 to 72. The higher the score the healthier the eating pattern. 31   Nutrition Management - Lipids   Lipids Labs Not Available   Prescribed Lipid Medication Yes   Statin Intensity High Intensity   Nutrition Management - Diabetes   Diabetes No   Nutrition Management Summary   Dietary Recommendations Low Fat;Low Cholesterol;Low Sodium   Stages of Change for Diet Compliance Preparation   Interventions Planned Attend Nutrition Education Class(es);Educate on Weight Management Principles;Educate on Benefits of Exercise   Interventions In Progress or Completed Understands Weight Management Principles;Attended Nutrition Class(es)   Patient Goals Goal #1   Goal #1 Description pre surg weight was 207 goal to stay at or below the 196LB   Goal #1 Target Date 02/15/19   Goal #1 Date Met " 02/13/19   Goal #1 Progress Towards Goal Met   Psychosocial Management   Psychosocial Assessment Re-assessment   Is there history of clinical depression or increased risk of depression? No previous history   Current Level of Stress per Patient Report Denies   Current Coping Skills Has Positive Support System   Initial Patient Health Questionnaire -9 Score (PHQ-9) for depression. 5-9 Minimal symptoms, 10-14 Minor depression, 15-19 Major depression, moderately severe, > 20 Major depression, severe  3   Initial [facility] Survey score.  Quality of Life:   If total score > 25 review individual areas where patient rated a 4 or 5.  Consider patients current medical condition and what role that plays on the score.   Adjust treatment protocol to improve areas of concern.  Consider the following:  PHQ9 score, DASI, and re-assessment within the next 30 days to assist with developing treatments.  21   Stages of Change Preparation   Interventions Planned Patient denies need for intervention at this time.   Other Core Components - Hypertension   History of or Diagnosis of Hypertension Yes   Currently taking Anti-Hypertensives Beta blocker;Ace Inhibitor   Other Core Components - Tobacco   History of Tobacco Use Yes   Quit Date or Planned Quit Date 12/26/18   Tobacco Use Status Recent (Quit < 6 mo ago)   Tobacco Habit Cigarettes;Cigar   Tobacco Use per Day (average) less than 1. smoked with hunting or fishing    Stages of Change Action   Other Core Components Summary   Interventions Planned Educate on importance of monitoring daily weight;Attend education class(es) on Nutrition;Educate patient regarding action steps for risk factor modification (lifestyle change/medications);Educate on benefits of smoking cessation  (pt states he has quit smoking completely )   Interventions In Progress or Completed Educated on importance of maintaining low sodium diet   Patient Goals Yes   Goal #1 Description remain smoke free   Goal #1  Target Date 04/12/19   Goal #1 Date Met 02/13/19   Goal #1 Progress Towards Goal abstaining   Activity/Exercise History   Activity/Exercise Assessment Re-assessment   Activity/Exercise Status prior to event? Was Physically Active   Number of Days Currently participating in Moderate Physical Activity? 7   Number of Days Currently performing  Aerobic Exercise (including rehab)? 6   Number of Minutes per Session Currently of Aerobic Exercise (average)? 30-60   Current Stage of Change (Physical Activity) Action   Current Stage of Change (Aerobic Exercise) Action   Patient Goals Goal #3   Goal #1 Description acheive met level of 5 to 6 by discharge to return to work safely    Goal #1 Target Date 04/12/19   Goal #1 Date Met 02/13/19   Goal #1 Progress Towards Goal met   Goal #3 Progress Towards Goal Exercising at 6.0 METS   Exercise Assessment   Resting HR 61 bpm   Exercise  bpm   Post Exercise HR 76 bpm   Resting /84   Exercise /56   Post Exercise BP 90/60   Effort Rating 3   Current MET Level 7.1   MET Level Goal 5-6   ECG Rhythm Normal sinus rhythm  (SR with ? PAT )   Ectopy None   Current Symptoms Denies symptoms   Limitations/Restrictions Sternal Precautions   Exercise Prescription   Mode Treadmill;Nustep   Duration/Time 45-60 min   Frequency 3 daysweek   THR (85% of age predicted max HR) 136   OMNI Effort Rating (0-10 Scale) 4-6/10   Progression Progress peak intensity by 1/2 MET per week   Recommended Home Exercise   Type of Exercise Bike   Frequency (days per week) 3-4   Duration (minutes per session) 30-45 min   Effort Rating Recommended 4-6/10   30 Day Exercise Plan (same)   Current Home Exercise   Type of Exercise Bike   Frequency (days per week) 3-4   Duration (minutes per session) 30-45   Learning Assessment   Learner Patient   Primary Language English   Preferred Learning Style Reading   Barriers to Learning No barriers noted   Patient Education   Education recommended Medication  Overview;Nutrition   Education classes attended Blood Pressure;Nutrition;Stress Management   Physician cosignature/electronic signature indicates approval of this ITP document. I have established, reviewed and made necessary changes to the individualized treatment plan and exercise prescription for this patient.

## 2019-03-15 ENCOUNTER — HOSPITAL ENCOUNTER (OUTPATIENT)
Dept: CARDIAC REHAB | Facility: OTHER | Age: 59
End: 2019-03-15
Attending: INTERNAL MEDICINE
Payer: COMMERCIAL

## 2019-03-15 PROCEDURE — 40000116 ZZH STATISTIC OP CR VISIT

## 2019-03-15 PROCEDURE — 93798 PHYS/QHP OP CAR RHAB W/ECG: CPT

## 2019-03-25 ENCOUNTER — HOSPITAL ENCOUNTER (OUTPATIENT)
Dept: CARDIAC REHAB | Facility: OTHER | Age: 59
End: 2019-03-25
Attending: INTERNAL MEDICINE
Payer: COMMERCIAL

## 2019-03-25 VITALS — HEIGHT: 71 IN | WEIGHT: 205 LBS | BODY MASS INDEX: 28.7 KG/M2

## 2019-03-25 PROCEDURE — 40000116 ZZH STATISTIC OP CR VISIT

## 2019-03-25 PROCEDURE — 93798 PHYS/QHP OP CAR RHAB W/ECG: CPT

## 2019-03-25 PROCEDURE — 40000575 ZZH STATISTIC OP CARDIAC VISIT #2

## 2019-03-25 PROCEDURE — 93797 PHYS/QHP OP CAR RHAB WO ECG: CPT

## 2019-03-25 ASSESSMENT — MIFFLIN-ST. JEOR: SCORE: 1761.74

## 2019-03-25 ASSESSMENT — 6 MINUTE WALK TEST (6MWT)
MALE CALC: 1937.38
FEMALE CALC: 1598.16
PREDICTED: 1949.2

## 2019-03-25 NOTE — PROGRESS NOTES
03/25/19 1000   Session   Session Discharge Note   Certified through this date 04/12/19   Cardiac Rehab Assessment   Cardiac Rehab Assessment 3/27/19:  Pt discharging today. Has MET his Goal #1-  Will return to work full time tomorrow.  Goal #2-Has quit smoking completely.  Goal #3-5 pound weight gain in the last week due to eating out frequently.  Plans to get back to normal eating habits and thinks he should be able to get back to his normal weight of 195.  Goal #4-Pt able to work up to a MET level of 7.1 before discharge.  Has a home exercise plan of walking or biking at least 150 minutes a week.     3/13/2019 Patient has been increasing exercise and has met his 6.0 MET level Goal#2 has totally quit smoking Goal#3 need continued vigilance with portion control to decrease weight and obtain goal weight of 185#  Has not has pain and has no other questions concerns or issues to be addressed2/13/19: Pt doing very well in cardiac rehab.  Has met his MET level goal, currently exercising at 5.5-6.0.  No longer having incisional pain.  HR and BP responses normal.  Goal #1-pt has a return to work date of March 25 due to weight restrictions.  Goal #2-pt has remained smoke free and states it has been easy for him since he didnt smoke too often.  Goal #3-Pt's weight has remained stable at 195.  Pt reports sx of ch pn and SOB several days before coming to ER.  Dx: Nstemi with CABG x 3.  positive fm hx.   Goal:  to return to work within 3 months to active job with [employer].  Goal 2: remain smoke free.  (smoked infrequently).  Goal 3 : keep weight keep weight at 196 or below.  Weighed 207 before surgery. Goal 4 attain met level of 5 to 6 by discharge.      General Information   Treatment Diagnosis Coronary Artery Bypass Surgery   Date of Treatment Diagnosis 12/31/18   Secondary Treatment Diagnosis NSTEMI  (12/26/18)   Significant Past CV History None   Comorbidities None   Other Medical History (positive fm hx of heart disease. )  "  Lead up symptoms SOB ch pn    Hospital Location [facility] Discharge Date 01/07/19   Signs and Symptoms Post Hospital Discharge Fatigue   Outpatient Cardiac Rehab Start Date 01/16/19   Primary Physician    Primary Physician Follow Up Completed  (2/7/19)   Surgeon    Surgeon Follow Up Completed   Cardiologist    Cardiologist Follow Up Scheduled   Ejection Fraction 40-45%   Risk Stratification Moderate   Summary of Cath Report   Summary of Cath Report Available   LAD diffuse disease   D1 \"full of disease\"   Cath Report Comments see report    Living and Work Status    Living Arrangements and Social Status house   Support System Live with an adult   Return to Employment Yes   Occupation works for [employer]   Preventative Medications   CMS recommended medications Ace inhibitors;Anticoagulants;Antiplatelets;Beta Blocker;Lipid Lowering   Fall Risk Screen   Fall screen completed by Cardiac Rehab   Have you fallen 2 or more times in the past year? No   Have you fallen and had an injury in the past year? No   Is patient a fall risk? No   Pain   Patient Currently in Pain Denies   Physical Assessments   Incisions WNL   Edema None   Right Lung Sounds not assessed   Left Lung Sounds not assessed   Limitations Surgical   Individualized Treatment Plan   Monitored Sessions Scheduled 36   Monitored Sessions Attended 27   Oxygen   Supplemental Oxygen needed No   Nutrition Management - Weight Management   Assessment Discharge   Age 58   Weight 93 kg (205 lb)   Height 1.803 m (5' 10.98\")   BMI (Calculated) 28.6   Goal Weight 88.9 kg (196 lb)   Initial Rate Your Plate Score. Dietary tool to assess eating patterns. Scores range from 24 to 72. The higher the score the healthier the eating pattern. 31   Discharge Rate Your Plate Score 49   Nutrition Management - Lipids   Lipids Labs Not Available   Prescribed Lipid Medication Yes   Statin Intensity High Intensity   Nutrition Management - Diabetes "   Diabetes No   Nutrition Management Summary   Dietary Recommendations Low Fat;Low Cholesterol;Low Sodium   Stages of Change for Diet Compliance Action   Interventions Planned Attend Nutrition Education Class(es);Educate on Weight Management Principles;Educate on Benefits of Exercise   Interventions In Progress or Completed Understands Weight Management Principles;Attended Nutrition Class(es);Educated on Benefits of Exercise   Patient Goals Goal #1   Goal #1 Description pre surg weight was 207 goal to stay at or below the 196LB   Goal #1 Target Date 02/15/19   Goal #1 Date Met 02/13/19   Goal #1 Progress Towards Goal Met   Nutrition Summary Comments weight back up from eating out while traveling   Psychosocial Management   Psychosocial Assessment Discharge   Is there history of clinical depression or increased risk of depression? No previous history   Current Level of Stress per Patient Report Denies   Current Coping Skills Has Positive Support System   Initial Patient Health Questionnaire -9 Score (PHQ-9) for depression. 5-9 Minimal symptoms, 10-14 Minor depression, 15-19 Major depression, moderately severe, > 20 Major depression, severe  3   Discharge PHQ-9 Score for Depression 0   Initial [facility] Survey score.  Quality of Life:   If total score > 25 review individual areas where patient rated a 4 or 5.  Consider patients current medical condition and what role that plays on the score.   Adjust treatment protocol to improve areas of concern.  Consider the following:  PHQ9 score, DASI, and re-assessment within the next 30 days to assist with developing treatments.  21   Discharge [employer] [facility] Survey Score 10   Stages of Change Maintenance   Interventions Planned Patient denies need for intervention at this time.   Patient Goal No   Psychosocial Target Outcome Identify absence or presence of depression using valid screening tool   Other Core Components - Hypertension   History of or Diagnosis of  Hypertension Yes   Currently taking Anti-Hypertensives Beta blocker;Ace Inhibitor   Other Core Components - Tobacco   History of Tobacco Use Yes   Quit Date or Planned Quit Date 12/26/18   Tobacco Use Status Recent (Quit < 6 mo ago)   Tobacco Habit Cigarettes;Cigar   Tobacco Use per Day (average) less than 1. smoked with hunting or fishing    Stages of Change Action   Other Core Components Summary   Interventions Planned Educate on importance of monitoring daily weight;Attend education class(es) on Nutrition;Educate patient regarding action steps for risk factor modification (lifestyle change/medications);Educate on benefits of smoking cessation  (pt states he has quit smoking completely )   Interventions In Progress or Completed Educated on importance of maintaining low sodium diet;Attended Nutrition class(es);Attended education class on Blood Pressure   Patient Goals Yes   Goal #1 Description remain smoke free   Goal #1 Target Date 04/12/19   Goal #1 Date Met 02/13/19   Goal #1 Progress Towards Goal abstaining   Other Core Components Target Outcome BP < 140/90 or < 130/80 with DM or CKD;Compliance with Diet, Medications and Symptom Management to allow for stable Heart Failure;Complete Smoking Cessation   Activity/Exercise History   Activity/Exercise Assessment Discharge   Activity/Exercise Status prior to event? Was Physically Active   Number of Days Currently participating in Moderate Physical Activity? 7   Number of Days Currently performing  Aerobic Exercise (including rehab)? 5-6   Number of Minutes per Session Currently of Aerobic Exercise (average)? 30-60   Current Stage of Change (Physical Activity) Action   Current Stage of Change (Aerobic Exercise) Action   Patient Goals Goal #3   Goal #1 Description acheive met level of 5 to 6 by discharge to return to work safely    Goal #1 Target Date 04/12/19   Goal #1 Date Met 02/13/19   Goal #1 Progress Towards Goal met   Goal #3 Progress Towards Goal Exercising at  7.0 METS   Activity/Exercise Target Outcome An Accumulation of 150  Minutes of Aerobic Activity per Week   Exercise Assessment   6 Minute Walk Predicted (Male) 1937.38   6 Minute Walk Predicted (Female) 1598.16   Resting HR 70 bpm   Exercise  bpm   Post Exercise HR 71 bpm   Resting /88   Exercise /70   Post Exercise BP 94/66   Effort Rating 4   Current MET Level 7.1   MET Level Goal 5-6   ECG Rhythm Normal sinus rhythm   Ectopy None   Current Symptoms Denies symptoms   Limitations/Restrictions Sternal Precautions   Exercise Prescription   Mode Treadmill;Nustep   Duration/Time 45-60 min   Frequency 3 daysweek   THR (85% of age predicted max HR) 136   OMNI Effort Rating (0-10 Scale) 4-6/10   Progression Progress peak intensity by 1/2 MET per week   Recommended Home Exercise   Type of Exercise Bike;Walking   Frequency (days per week) 5-6   Duration (minutes per session) 30-45 min   Effort Rating Recommended 4-6/10   30 Day Exercise Plan (walking or biking at least 150 minutes a week)   Current Home Exercise   Type of Exercise Bike;Walking   Frequency (days per week) 3-4   Duration (minutes per session) 30-45   Learning Assessment   Learner Patient   Primary Language English   Preferred Learning Style Reading   Barriers to Learning No barriers noted   Patient Education   Education recommended Medication Overview;Nutrition   Education classes attended Blood Pressure;Nutrition;Stress Management   Pt made significant gains in exercise tolerance. Initially patient tolerated 13 minutes at 3 .1 METs, now tolerating 50 minutes at 7.1 METs.  The PT was given instructions on frequency, intensity, and duration for continued exercise as well as muscle conditioning and stretching exercises.  Your PT plans to walk or bike at least 150 minutes a week.

## 2019-05-23 ENCOUNTER — OFFICE VISIT (OUTPATIENT)
Dept: FAMILY MEDICINE | Facility: OTHER | Age: 59
End: 2019-05-23
Attending: CHIROPRACTOR
Payer: OTHER MISCELLANEOUS

## 2019-05-23 ENCOUNTER — HOSPITAL ENCOUNTER (OUTPATIENT)
Dept: GENERAL RADIOLOGY | Facility: OTHER | Age: 59
Discharge: HOME | End: 2019-05-23
Attending: CHIROPRACTOR | Admitting: CHIROPRACTOR
Payer: OTHER MISCELLANEOUS

## 2019-05-23 VITALS
BODY MASS INDEX: 30.21 KG/M2 | DIASTOLIC BLOOD PRESSURE: 68 MMHG | SYSTOLIC BLOOD PRESSURE: 100 MMHG | HEIGHT: 70 IN | WEIGHT: 211 LBS | HEART RATE: 60 BPM | TEMPERATURE: 98.5 F | RESPIRATION RATE: 16 BRPM

## 2019-05-23 DIAGNOSIS — S43.431A LABRAL TEAR OF SHOULDER, RIGHT, INITIAL ENCOUNTER: ICD-10-CM

## 2019-05-23 DIAGNOSIS — M25.511 ACUTE PAIN OF RIGHT SHOULDER: Primary | ICD-10-CM

## 2019-05-23 DIAGNOSIS — M25.511 ACUTE PAIN OF RIGHT SHOULDER: ICD-10-CM

## 2019-05-23 PROCEDURE — 73030 X-RAY EXAM OF SHOULDER: CPT | Mod: RT

## 2019-05-23 PROCEDURE — 99213 OFFICE O/P EST LOW 20 MIN: CPT | Performed by: CHIROPRACTOR

## 2019-05-23 RX ORDER — ROSUVASTATIN CALCIUM 40 MG/1
1 TABLET, COATED ORAL AT BEDTIME
COMMUNITY
Start: 2019-04-24 | End: 2020-12-10

## 2019-05-23 ASSESSMENT — PAIN SCALES - GENERAL: PAINLEVEL: MODERATE PAIN (5)

## 2019-05-23 ASSESSMENT — MIFFLIN-ST. JEOR: SCORE: 1773.34

## 2019-05-23 NOTE — NURSING NOTE
Patient presenting with a right shoulder injury. He hurt it at work on 5/7/19.  No LMP for male patient.  Medication Reconciliation: complete     Review Of Systems  Skin: negative  Eyes: negative  Ears/Nose/Throat: negative  Respiratory: No shortness of breath, dyspnea on exertion, cough, or hemoptysis  Cardiovascular: negative  Gastrointestinal: negative  Genitourinary: negative  Musculoskeletal: positive for right shoulder pain  Neurologic: negative  Psychiatric: negative  Hematologic/Lymphatic/Immunologic: negative  Endocrine: negative      Lakia Tran LPN  5/23/2019 3:33 PM

## 2019-05-24 NOTE — PROGRESS NOTES
CHIEF COMPLAINT: Ellis Scott is a 58 year old  male  Chief Complaint   Patient presents with     Work Comp     right shoulder       HISTORY OF PRESENTING INJURY     Ellis presents today for evaluation of his right shoulder from what he describes as two separate work related injuries.  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 First, Ellis states last fall he was pulling a case of soda towards him and felt a pain to occur in the shoulder.  He did report this but shortly thereafter sustained a heart attack and never had his shoulder pain addressed.  This discomfort has remained since then.  Recently, he experienced another bout of acute right shoulder pain while working overhead with his arm.  He notified his employer again and it was suggested he have it looked at.    Ellis states the pain is mostly in the front of the shoulder and hurts when he moves it in two motions.  He demonstrates to me flexion and external rotation.        PAST MEDICAL HISTORY:  Past Medical History:   Diagnosis Date     Hearing loss     No Comments Provided     Vasectomy status     No Comments Provided       PAST SURGICAL HISTORY:  Past Surgical History:   Procedure Laterality Date     COLONOSCOPY      2009,normal--10 year followup     OTHER SURGICAL HISTORY      73408,INNER EAR SURGERY, x several     VASECTOMY      No Comments Provided       ALLERGIES:  No Known Allergies    CURRENT MEDICATIONS:  Current Outpatient Medications   Medication Sig Dispense Refill     amLODIPine (NORVASC) 5 MG tablet TK 1 T PO D  11     clopidogrel (PLAVIX) 75 MG tablet TK 1 T PO D  11     ELIQUIS 5 MG tablet TK 1 T PO BID FOR 30 DAYS  0     metoprolol succinate ER (TOPROL-XL) 50 MG 24 hr tablet TK 1 T PO BID  11     nitroGLYcerin (NITROSTAT) 0.4 MG sublingual tablet TK 0.4MG SUBLINGUALLY Q 5 MINUTES X3 DOSES PRN FOR CHEST PAIN.  1     rosuvastatin (CRESTOR) 40 MG tablet Take 1 tablet by mouth At Bedtime       triamcinolone (KENALOG) 0.1 %  ointment APPLY EXTERNALLY TO THE AFFECTED AREA FOUR TIMES DAILY 80 g 0       SOCIAL HISTORY:  Social History     Socioeconomic History     Marital status:      Spouse name: Not on file     Number of children: Not on file     Years of education: Not on file     Highest education level: Not on file   Occupational History     Not on file   Social Needs     Financial resource strain: Not on file     Food insecurity:     Worry: Not on file     Inability: Not on file     Transportation needs:     Medical: Not on file     Non-medical: Not on file   Tobacco Use     Smoking status: Former Smoker     Types: Cigarettes, Cigars     Last attempt to quit: 2014     Years since quittin.6     Smokeless tobacco: Never Used     Tobacco comment: Quit smoking: Only smokes during deer season and has 1-2 cigars per year   Substance and Sexual Activity     Alcohol use: Yes     Alcohol/week: 1.2 oz     Drug use: Unknown     Types: Other     Comment: Drug use: No     Sexual activity: Yes     Partners: Female     Birth control/protection: Surgical   Lifestyle     Physical activity:     Days per week: Not on file     Minutes per session: Not on file     Stress: Not on file   Relationships     Social connections:     Talks on phone: Not on file     Gets together: Not on file     Attends Restoration service: Not on file     Active member of club or organization: Not on file     Attends meetings of clubs or organizations: Not on file     Relationship status: Not on file     Intimate partner violence:     Fear of current or ex partner: Not on file     Emotionally abused: Not on file     Physically abused: Not on file     Forced sexual activity: Not on file   Other Topics Concern     Not on file   Social History Narrative    , 2 children in high school.      Works in management in [employer]       FAMILY HISTORY:  Family History   Problem Relation Age of Onset     Heart Disease Mother         Heart Disease     Other - See  "Comments Other         Parkinson's disease     Other - See Comments Other         Parkinson's disease     Prostate Cancer Other         Cancer-prostate     Heart Disease Brother         Heart Disease,5 vessel bypass       REVIEW OF SYSTEMS:    Nursing Notes:   Lakia Tran LPN  5/23/2019  4:15 PM  Signed  Patient presenting with a right shoulder injury. He hurt it at work on 5/7/19.  No LMP for male patient.  Medication Reconciliation: complete     Review Of Systems  Skin: negative  Eyes: negative  Ears/Nose/Throat: negative  Respiratory: No shortness of breath, dyspnea on exertion, cough, or hemoptysis  Cardiovascular: negative  Gastrointestinal: negative  Genitourinary: negative  Musculoskeletal: positive for right shoulder pain  Neurologic: negative  Psychiatric: negative  Hematologic/Lymphatic/Immunologic: negative  Endocrine: negative      Lakia Tran LPN  5/23/2019 3:33 PM    I have reviewed the ROS with Ellis    PHYSICAL EXAM:   /68 (BP Location: Right arm, Patient Position: Sitting, Cuff Size: Adult Large)   Pulse 60   Temp 98.5  F (36.9  C) (Tympanic)   Resp 16   Ht 1.778 m (5' 10\")   Wt 95.7 kg (211 lb)   BMI 30.28 kg/m   Body mass index is 30.28 kg/m .    General Appearance: Pleasant, alert, appropriate appearance for age. No acute distress.  Head Exam: Normal. Normocephalic, Atraumatic  Eyes: Pupils are equal, round, and reactive to light. Extraocular movements intact.  Oropharynx: Normal buccal mucosa. Normal pharynx.  Ears: Normal TM's bilaterally. Normal auditory canals and external ears  Neck: Supple, no masses or nodes, no lymphadenopathy, no thyromegaly  Lungs: Normal chest wall expansion and respirations. Ausculation is clear.  Cardiovascular: Regular rate and rhythm, S1, S2, no murmurs  Gastrointestinal: Soft, nontender, no abnormal masses or organomegaly. Bowel sounds are normal.  Skin: no rashes, abrasions, infections  Neurologic Exam: Cranial Nerves 2 through 12 " grossly intact.  Normal gait.  Symmetrical DTR's, normal gross motor movement, tone and coordination.  No tremors.   Psychiatric Exam: alert, orientated and appropriate affect.      Right Shoulder:  Range of motion testing utilizing goniometer:   Flexion: full to 174 degrees (180)   Extension: 56 degrees (45-60)   Abduction: full to 167 degrees (160)   External Rotation: 82 degrees (90)   Internal Rotation: 73 degrees (70-90)    Sensory is: Intact    Hawkin's sign: Negative  Neer's Sign: negative.  Cross body adduction:  negative.  Drop arm test: negative.  Weakness at waist level: negative.  Bicipital testing: negative.  Lift off test:  negative.  Lala's test:positive.    X-rays taken of the right shoulder:    PROCEDURE:  XR SHOULDER 2 VIEW RIGHT     HISTORY: Acute pain of right shoulder     COMPARISON:  None.     TECHNIQUE:  2 views of the right shoulder were obtained.     FINDINGS:  There are moderate degenerative changes of the  acromioclavicular and glenohumeral joints. No acute fracture or  dislocation.                                                                       IMPRESSION: Moderate degenerative disease.       LATISHA COFFMAN MD    I have reviewed the x-rays and showed/discussed the images with the patient.      IMPRESSION/PLAN:    There is evidence of underlying degenerative changes and I also suspect a labral tear.  Ordered physical therapy for conservative management of these findings.  F/u to occur in 6 weeks. MRI and/or orthopedic consult if no significant improvement.  Restrictions were placed.  See workability form scanned today.  Patient had full understanding of the plan and was in agreement.  He did sign the workability form.    Post Encounter: No further questions and all concerns answered to their satisfaction. Greater than 50% of this 31 minute encounter was spent in counseling and coordination of care regarding the above condition.          Ashish Yeung DC  Director - Occupational  Medicine Department  [facility]  [address] [place] [address]  Phone [phone]  Fax [phone]    Disclaimer:  This note consists of words and symbols derived from keyboarding, dictation, or using voice recognition software. As a result, there may be errors in the script that have gone undetected. Please consider this when interpreting information found in this note.    8:42 PM 5/23/2019

## 2019-06-06 ENCOUNTER — TELEPHONE (OUTPATIENT)
Dept: CHIROPRACTIC MEDICINE | Facility: OTHER | Age: 59
End: 2019-06-06

## 2019-06-06 NOTE — TELEPHONE ENCOUNTER
The patient called stating he needs an order for an MRI.  He has seen Ashish Yeung for workman's comp already.  The patient stated that JS did not think they would order an MRI.  His company wants him to have an MRI now so he needs an order for it.  Please call him at [phone]   no

## 2019-06-06 NOTE — TELEPHONE ENCOUNTER
Returned call to patient and got his adjusters name and number.  Rhonda Bosworth  Claim # [id number]  Phone: [phone]  Fax: [phone]    Called and left message with  to find out if they will pay for an MRI on the patient's shoulder.  If they will, an order should be sent to Ashish Yeung DC.  Lakia Tran LPN 6/6/2019 4:31 PM

## 2019-06-18 NOTE — TELEPHONE ENCOUNTER
FYI:  Contacted Tish patient's work comp . She states that she will approve MRI if Ashish Yeung DC thinks that it is necessary after physical therapy has been done.  Patient was contacted and informed and he verbalized understanding.

## 2019-06-20 ENCOUNTER — HOSPITAL ENCOUNTER (OUTPATIENT)
Dept: PHYSICAL THERAPY | Facility: OTHER | Age: 59
Setting detail: THERAPIES SERIES
End: 2019-06-20
Attending: CHIROPRACTOR
Payer: OTHER MISCELLANEOUS

## 2019-06-20 DIAGNOSIS — M25.511 ACUTE PAIN OF RIGHT SHOULDER: ICD-10-CM

## 2019-06-20 DIAGNOSIS — S43.431A LABRAL TEAR OF SHOULDER, RIGHT, INITIAL ENCOUNTER: ICD-10-CM

## 2019-06-20 PROCEDURE — 97161 PT EVAL LOW COMPLEX 20 MIN: CPT | Mod: GP | Performed by: PHYSICAL THERAPIST

## 2019-06-20 PROCEDURE — 97035 APP MDLTY 1+ULTRASOUND EA 15: CPT | Mod: GP | Performed by: PHYSICAL THERAPIST

## 2019-06-20 PROCEDURE — 97140 MANUAL THERAPY 1/> REGIONS: CPT | Mod: GP | Performed by: PHYSICAL THERAPIST

## 2019-06-21 NOTE — PROGRESS NOTES
"   06/20/19 1600   General Information   Type of Visit Initial OP Ortho PT Evaluation   Start of Care Date 06/20/19   Referring Physician Ashish Yeung   Patient/Family Goals Statement improve R UE pain with activity   Orders Evaluate and Treat   Date of Order 05/23/19   Certification Required? No   Medical Diagnosis Acute pain of right shoulder M25.511; Labral tear of shoulder, right, initial encounter S43.431A    Surgical/Medical history reviewed Yes   Precautions/Limitations no known precautions/limitations   Weight-Bearing Status - LUE full weight-bearing   Weight-Bearing Status - RUE full weight-bearing   General Information Comments please refer to pt's medical record for any additional information   Body Part(s)   Body Part(s) Shoulder   Presentation and Etiology   Pertinent history of current problem (include personal factors and/or comorbidities that impact the POC) Pt injured his R shoulder at work while lifting a case up and putting it on a shelf. This was the second time he injured it. The first time was at the end of last year when he was pulling a case of pop and it was hung up and when he went to pull it loose he \"luis\" his shoulder. He has troulble reaching for things and his shoulder will also wake him up at night. He is hoping to do a trial of PT and then get a MRI if there is no improvement   Impairments A. Pain;D. Decreased ROM   Functional Limitations perform activities of daily living;perform required work activities;perform desired leisure / sports activities   Symptom Location R shoulder   How/Where did it occur At work   Chronicity Chronic   Pain rating (0-10 point scale) Best (/10);Worst (/10)   Best (/10) 0   Worst (/10) 6   Pain quality A. Sharp;F. Stabbing   Frequency of pain/symptoms C. With activity   Pain/symptoms exacerbated by G. Certain positions;H. Overhead reach;K. Home tasks;L. Work tasks;C. Lifting;D. Carrying  (overhead activity)   Pain/symptoms eased by E. Changing " positions;F. Certain positions  (pain subsides on it's own usually with change of position)   Progression of symptoms since onset: Improved;Worsened;Unchanged  (depends on the day)   Prior Level of Function   Prior Level of Function-Mobility Ind   Prior Level of Function-ADLs Ind   Current Level of Function   Current Community Support Family/friend caregiver   Patient role/employment history A. Employed   Employment Comments [employer]   Living environment House/townhome   Current equipment-Gait/Locomotion None   Current equipment-ADL None   Fall Risk Screen   Fall screen completed by PT   Have you fallen 2 or more times in the past year? No   Have you fallen and had an injury in the past year? No   Is patient a fall risk? No   Abuse Screen (yes response referral indicated)   Feels Unsafe at Home or Work/School no   Feels Threatened by Someone no   Does Anyone Try to Keep You From Having Contact with Others or Doing Things Outside Your Home? no   Physical Signs of Abuse Present no   Functional Scales   Functional Scales Other   Other Scales  PSFS   Shoulder Objective Findings   Observation pt is pleasant and in no distress   Integumentary  no bruising or swelling in sore area   Posture forward head and rounded shoulders   Cervical Screen (ROM, quadrant) normal ROM all planes   Shoulder ROM Comment normal/functional range bilat   Neer's Test POS   Urban-Go Test POS   Mershon's Test NEG   Crossover Test NEG   Palpation no reproduced pain with palpation   Planned Therapy Interventions   Planned Therapy Interventions joint mobilization;manual therapy;motor coordination training;neuromuscular re-education;strengthening;stretching   Planned Modality Interventions   Planned Modality Interventions Cryotherapy;Electrical stimulation;Ultrasound   Clinical Impression   Criteria for Skilled Therapeutic Interventions Met yes, treatment indicated   PT Diagnosis R shoulder pain   Influenced by the following impairments pain with  R shoulder movement, ie overhead   Functional limitations due to impairments trouble doing work tasks due to pain   Clinical Presentation Stable/Uncomplicated   Clinical Decision Making (Complexity) Low complexity   Therapy Frequency 2 times/Week   Predicted Duration of Therapy Intervention (days/wks) 8 weeks   Risk & Benefits of therapy have been explained Yes   Patient, Family & other staff in agreement with plan of care Yes   Education Assessment   Preferred Learning Style Reading;Listening;Demonstration;Pictures/video   Barriers to Learning No barriers   ORTHO GOALS   PT Ortho Eval Goals 1;2;3   Ortho Goal 1   Goal Identifier Pain   Goal Description Pt to report a max pain level of 1/10 in R shoulder throughout the day for improved ADLs   Target Date 08/16/19   Ortho Goal 2   Goal Identifier ADLs   Goal Description Pt to demo overhead reach without increasing R shoulder pain for improved ADLs and job performance   Target Date 08/16/19   Ortho Goal 3   Goal Identifier Sleep   Goal Description Pt to subjectively report an improvement in quality of sleep and not wake due to pain for improved wellness   Target Date 08/16/19   Total Evaluation Time   PT Shane Low Complexity Minutes (63270) 15

## 2019-06-27 ENCOUNTER — HOSPITAL ENCOUNTER (OUTPATIENT)
Dept: PHYSICAL THERAPY | Facility: OTHER | Age: 59
Setting detail: THERAPIES SERIES
End: 2019-06-27
Attending: CHIROPRACTOR
Payer: OTHER MISCELLANEOUS

## 2019-06-27 PROCEDURE — 97140 MANUAL THERAPY 1/> REGIONS: CPT | Mod: GP | Performed by: PHYSICAL THERAPIST

## 2019-06-27 PROCEDURE — 97035 APP MDLTY 1+ULTRASOUND EA 15: CPT | Mod: GP | Performed by: PHYSICAL THERAPIST

## 2019-07-01 ENCOUNTER — HOSPITAL ENCOUNTER (OUTPATIENT)
Dept: PHYSICAL THERAPY | Facility: OTHER | Age: 59
Setting detail: THERAPIES SERIES
End: 2019-07-01
Attending: CHIROPRACTOR
Payer: OTHER MISCELLANEOUS

## 2019-07-01 PROCEDURE — 97035 APP MDLTY 1+ULTRASOUND EA 15: CPT | Mod: GP

## 2019-07-01 PROCEDURE — 97140 MANUAL THERAPY 1/> REGIONS: CPT | Mod: GP

## 2019-07-03 ENCOUNTER — HOSPITAL ENCOUNTER (EMERGENCY)
Facility: OTHER | Age: 59
Discharge: HOME | End: 2019-07-03
Attending: FAMILY MEDICINE | Admitting: FAMILY MEDICINE
Payer: COMMERCIAL

## 2019-07-03 VITALS
WEIGHT: 207 LBS | DIASTOLIC BLOOD PRESSURE: 77 MMHG | HEIGHT: 71 IN | TEMPERATURE: 97.4 F | SYSTOLIC BLOOD PRESSURE: 133 MMHG | BODY MASS INDEX: 28.98 KG/M2 | RESPIRATION RATE: 16 BRPM

## 2019-07-03 DIAGNOSIS — Z79.01 CHRONIC ANTICOAGULATION: ICD-10-CM

## 2019-07-03 DIAGNOSIS — R04.0 EPISTAXIS: ICD-10-CM

## 2019-07-03 PROCEDURE — 30901 CONTROL OF NOSEBLEED: CPT | Performed by: FAMILY MEDICINE

## 2019-07-03 PROCEDURE — 99283 EMERGENCY DEPT VISIT LOW MDM: CPT | Mod: 25 | Performed by: FAMILY MEDICINE

## 2019-07-03 PROCEDURE — 25000125 ZZHC RX 250: Performed by: FAMILY MEDICINE

## 2019-07-03 PROCEDURE — 30901 CONTROL OF NOSEBLEED: CPT | Mod: Z6 | Performed by: FAMILY MEDICINE

## 2019-07-03 RX ADMIN — COCAINE HYDROCHLORIDE: 40 SOLUTION TOPICAL at 07:45

## 2019-07-03 ASSESSMENT — ENCOUNTER SYMPTOMS
CONSTITUTIONAL NEGATIVE: 1
CARDIOVASCULAR NEGATIVE: 1
RESPIRATORY NEGATIVE: 1

## 2019-07-03 ASSESSMENT — MIFFLIN-ST. JEOR: SCORE: 1771.08

## 2019-07-03 NOTE — LETTER
July 3, 2019      To Whom It May Concern:      Ellis Scott was seen in our Emergency Department today, 07/03/19.  He will need to take it easy for the rest of the day, stay home and avoid lifting/bending.  I expect his condition to improve over the next 2-3 days.  He may return to work when improved.    Sincerely,        Delroy Aguilar MD

## 2019-07-03 NOTE — DISCHARGE INSTRUCTIONS
"Mr. Scott,  It was nice to meet you.  As we talked, you have a nosebleed in the most common spot.  It is common to have repeat bleeding as some point.  If bleeding recurs, use the nasal clamp (or clamp nose between knuckles as we reviewed) for 5 full minutes.  If bleeding continues then clamp for 10 full minutes.  And if bleeding continues, continue to clamp and come to the ED.    You can use some nasal saline (salt water spray like \"Ocean Spray\" or generic equivalent) gentle spray twice a day followed by putting a thin film of petroleum jelly on the inside of your nose to keep it moisturized.    Drink plenty of water/avoid dehydration.    Follow up in clinic if bleeding continues to be a problem for you.    Dr. Juan Aguilar  "

## 2019-07-03 NOTE — ED TRIAGE NOTES
"ED Nursing Triage Note (General)   ________________________________    Ellis SARMIENTO Tyler is a 58 year old Male that presents to triage private car  With history of  Nosebleed that won't stop.  Recently started on blood thinner after triple bypass reported by patient   Significant symptoms had onset 30 minute(s) ago.  /77   Temp 97.4  F (36.3  C) (Oral)   Resp 16   Ht 1.803 m (5' 11\")   Wt 93.9 kg (207 lb)   BMI 28.87 kg/m  t  Patient appears alert , in mild distress., and cooperative behavior.    GCS Total = 15  Airway: intact  Breathing noted as Normal.  Circulation Normal  Skin normal  Action taken:  To room 907 coached to apply pressure chux placed      PRE HOSPITAL PRIOR LIVING SITUATION Spouse  "

## 2019-07-03 NOTE — ED PROVIDER NOTES
History     Chief Complaint   Patient presents with     Epistaxis     HPI  Ellis Scott is a 58 year old male on plavix antiplatelet therapy for hx of CABG, and on Eliquis for hx of paroxysmal afib who blew his nose this morning between stops and had persistent left nostril bleeding.  Holding pressure with finger/thumb and presenting to ED.    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 - LIMA-LAD, SVG-OM1, radial artery-diagonal - [facility] 01/18/2019     Priority: Medium     Platelet inhibition due to Plavix 01/18/2019     Priority: Medium     Contact dermatitis and eczema 02/13/2018     Priority: Medium     Overview:   Chronic       Mixed hyperlipidemia 02/13/2018     Priority: Medium     Varices of other sites 02/13/2018     Priority: Medium     Overview:   Right Leg       Mixed conductive and sensorineural hearing loss, bilateral 05/15/2017     Priority: Medium     Wheezing 01/28/2013     Priority: Medium        Past Medical History:    Past Medical History:   Diagnosis Date     Hearing loss      Vasectomy status        Past Surgical History:    Past Surgical History:   Procedure Laterality Date     COLONOSCOPY      2009,normal--10 year followup     OTHER SURGICAL HISTORY      36723,INNER EAR SURGERY, x several     VASECTOMY      No Comments Provided       Family History:    Family History   Problem Relation Age of Onset     Heart Disease Mother         Heart Disease     Other - See Comments Other         Parkinson's disease     Other - See Comments Other         Parkinson's disease     Prostate Cancer Other         Cancer-prostate      "Heart Disease Brother         Heart Disease,5 vessel bypass       Social History:  Marital Status:   [2]  Social History     Tobacco Use     Smoking status: Former Smoker     Types: Cigarettes, Cigars     Last attempt to quit: 2014     Years since quittin.8     Smokeless tobacco: Never Used     Tobacco comment: Quit smoking: Only smokes during deer season and has 1-2 cigars per year   Substance Use Topics     Alcohol use: Yes     Alcohol/week: 1.2 oz     Types: 2 Cans of beer per week     Drug use: Unknown     Types: Other     Comment: Drug use: No        Medications:      amLODIPine (NORVASC) 5 MG tablet   clopidogrel (PLAVIX) 75 MG tablet   ELIQUIS 5 MG tablet   metoprolol succinate ER (TOPROL-XL) 50 MG 24 hr tablet   rosuvastatin (CRESTOR) 40 MG tablet   nitroGLYcerin (NITROSTAT) 0.4 MG sublingual tablet   triamcinolone (KENALOG) 0.1 % ointment         Review of Systems   Constitutional: Negative.    HENT: Positive for nosebleeds.    Respiratory: Negative.    Cardiovascular: Negative.        Physical Exam   BP: 133/77  Heart Rate: 61  Temp: 97.4  F (36.3  C)  Resp: 16  Height: 180.3 cm (5' 11\")  Weight: 93.9 kg (207 lb)      Physical Exam   Constitutional: He appears well-developed and well-nourished.   HENT:   Head: Normocephalic.   Left nostril with fresh blood/clot along left Kiesselbach's plexus - no active bleeding.  Minimal blood in posterior pharynx and no active posterior bleeding.  Nasal clamp placed.   Eyes: Pupils are equal, round, and reactive to light. Conjunctivae and EOM are normal.   Cardiovascular: Normal rate and regular rhythm.   Left radial artery is absent due to harvesting for his CABG. pulse check on the right radial artery with regular rate and rhythm.   Psychiatric: He has a normal mood and affect.   Nursing note and vitals reviewed.      ED Course        Procedures  7:50 AM Exam with fresh clot/bleeding in left Kiesselbach's plexus.  Nasal clamp placed.    7:58 AM 4% " cocaine soaked cotton ball placed in left nostril with clamp replaced.  No active bleeding or posterior pharynx bleeding.             Critical Care time:  none               No results found for this or any previous visit (from the past 24 hour(s)).    Medications   cocaine 4 % solution ( Topical Given by Other 7/3/19 9291)     8:31 AM pack removed and still some slight oozing from the left Kiesselbach's plexus and additional cocaine soaked cottonball reapplied and will keep this in place for another 10 to 15 minutes.    8:56 AM second pack is removed and minimal ooze of blood and will see how he does without clamping for a while.    10:10 AM patient is been fairly stable without any significant recurrent bleeding and is ready to go home.  Discussed he should take it easy for the rest of the day and not return to work and lifting until his nosebleed has stopped for a couple days.  Assessments & Plan (with Medical Decision Making)   58-year-old male on aspirin Plavix and Eliquis for history of CAD status post CABG and history of post CABG A. fib now controlled.  He developed nosebleed today and with temporary nasal packing with cocaine soaked cotton balls his bleeding seems to have resolved but he does have a soft fresh clot in Kiesselbach's plexus on the left side and will need to take it easy for the rest of the day.  I think we can hold off on nasal balloon or packing at this time and just have him avoid bending and lifting for the rest of the day.      I have reviewed the nursing notes.    I have reviewed the findings, diagnosis, plan and need for follow up with the patient.          Medication List      There are no discharge medications for this visit.         Final diagnoses:   Epistaxis - left anterior nosebleed.   Chronic anticoagulation       7/3/2019   [facility] MD  07/03/19 8728

## 2019-07-11 ENCOUNTER — HOSPITAL ENCOUNTER (OUTPATIENT)
Dept: PHYSICAL THERAPY | Facility: OTHER | Age: 59
Setting detail: THERAPIES SERIES
End: 2019-07-11
Attending: CHIROPRACTOR
Payer: OTHER MISCELLANEOUS

## 2019-07-11 PROCEDURE — 97110 THERAPEUTIC EXERCISES: CPT | Mod: GP | Performed by: PHYSICAL THERAPIST

## 2019-07-11 PROCEDURE — 97035 APP MDLTY 1+ULTRASOUND EA 15: CPT | Mod: GP | Performed by: PHYSICAL THERAPIST

## 2019-07-11 PROCEDURE — 97140 MANUAL THERAPY 1/> REGIONS: CPT | Mod: GP | Performed by: PHYSICAL THERAPIST

## 2019-07-15 ENCOUNTER — HOSPITAL ENCOUNTER (OUTPATIENT)
Dept: PHYSICAL THERAPY | Facility: OTHER | Age: 59
Setting detail: THERAPIES SERIES
End: 2019-07-15
Attending: CHIROPRACTOR
Payer: OTHER MISCELLANEOUS

## 2019-07-15 PROCEDURE — 97035 APP MDLTY 1+ULTRASOUND EA 15: CPT | Mod: GP

## 2019-07-15 PROCEDURE — 97110 THERAPEUTIC EXERCISES: CPT | Mod: GP

## 2019-07-15 PROCEDURE — 97140 MANUAL THERAPY 1/> REGIONS: CPT | Mod: GP

## 2019-07-17 ENCOUNTER — OFFICE VISIT (OUTPATIENT)
Dept: FAMILY MEDICINE | Facility: OTHER | Age: 59
End: 2019-07-17
Attending: CHIROPRACTOR
Payer: OTHER MISCELLANEOUS

## 2019-07-17 VITALS
SYSTOLIC BLOOD PRESSURE: 116 MMHG | TEMPERATURE: 98.7 F | RESPIRATION RATE: 16 BRPM | HEIGHT: 70 IN | DIASTOLIC BLOOD PRESSURE: 68 MMHG | BODY MASS INDEX: 29.63 KG/M2 | HEART RATE: 60 BPM | WEIGHT: 207 LBS

## 2019-07-17 DIAGNOSIS — M25.511 ACUTE PAIN OF RIGHT SHOULDER: Primary | ICD-10-CM

## 2019-07-17 PROCEDURE — 99212 OFFICE O/P EST SF 10 MIN: CPT | Performed by: CHIROPRACTOR

## 2019-07-17 ASSESSMENT — MIFFLIN-ST. JEOR: SCORE: 1755.2

## 2019-07-17 ASSESSMENT — PAIN SCALES - GENERAL: PAINLEVEL: NO PAIN (1)

## 2019-07-17 NOTE — PROGRESS NOTES
CHIEF COMPLAINT: Ellis Scott is a 58 year old  male  Chief Complaint   Patient presents with     Work Comp     right shoulder       HISTORY OF PRESENTING INJURY     Ellis presents today for follow up examination of his right shoulder.  Physical therapy has been very beneficial for him.  He states he is essentially working without restrictions and is able to golf without much difficulty.  Treatment thus far has consisted of [place] and ultrasound application.          PAST MEDICAL HISTORY:  Past Medical History:   Diagnosis Date     Hearing loss     No Comments Provided     Vasectomy status     No Comments Provided       PAST SURGICAL HISTORY:  Past Surgical History:   Procedure Laterality Date     COLONOSCOPY      2009,normal--10 year followup     OTHER SURGICAL HISTORY      55456,INNER EAR SURGERY, x several     VASECTOMY      No Comments Provided       ALLERGIES:  No Known Allergies    CURRENT MEDICATIONS:  Current Outpatient Medications   Medication Sig Dispense Refill     amLODIPine (NORVASC) 5 MG tablet TK 1 T PO D  11     clopidogrel (PLAVIX) 75 MG tablet TK 1 T PO D  11     ELIQUIS 5 MG tablet TK 1 T PO BID FOR 30 DAYS  0     metoprolol succinate ER (TOPROL-XL) 50 MG 24 hr tablet TK 1 T PO BID  11     nitroGLYcerin (NITROSTAT) 0.4 MG sublingual tablet TK 0.4MG SUBLINGUALLY Q 5 MINUTES X3 DOSES PRN FOR CHEST PAIN.  1     rosuvastatin (CRESTOR) 40 MG tablet Take 1 tablet by mouth At Bedtime       triamcinolone (KENALOG) 0.1 % ointment APPLY EXTERNALLY TO THE AFFECTED AREA FOUR TIMES DAILY 80 g 0       SOCIAL HISTORY:  Social History     Socioeconomic History     Marital status:      Spouse name: Not on file     Number of children: Not on file     Years of education: Not on file     Highest education level: Not on file   Occupational History     Not on file   Social Needs     Financial resource strain: Not on file     Food insecurity:     Worry: Not on file     Inability: Not on file     Transportation  needs:     Medical: Not on file     Non-medical: Not on file   Tobacco Use     Smoking status: Former Smoker     Types: Cigarettes, Cigars     Last attempt to quit: 2014     Years since quittin.8     Smokeless tobacco: Never Used     Tobacco comment: Quit smoking: Only smokes during deer season and has 1-2 cigars per year   Substance and Sexual Activity     Alcohol use: Yes     Alcohol/week: 1.2 oz     Types: 2 Cans of beer per week     Drug use: Unknown     Types: Other     Comment: Drug use: No     Sexual activity: Yes     Partners: Female     Birth control/protection: Surgical   Lifestyle     Physical activity:     Days per week: Not on file     Minutes per session: Not on file     Stress: Not on file   Relationships     Social connections:     Talks on phone: Not on file     Gets together: Not on file     Attends Jainism service: Not on file     Active member of club or organization: Not on file     Attends meetings of clubs or organizations: Not on file     Relationship status: Not on file     Intimate partner violence:     Fear of current or ex partner: Not on file     Emotionally abused: Not on file     Physically abused: Not on file     Forced sexual activity: Not on file   Other Topics Concern     Not on file   Social History Narrative    , 2 children in high school.      Works in management in [employer]       FAMILY HISTORY:  Family History   Problem Relation Age of Onset     Heart Disease Mother         Heart Disease     Other - See Comments Other         Parkinson's disease     Other - See Comments Other         Parkinson's disease     Prostate Cancer Other         Cancer-prostate     Heart Disease Brother         Heart Disease,5 vessel bypass       REVIEW OF SYSTEMS:    No change in ROS from our last encounter on 19    PHYSICAL EXAM:   /68 (BP Location: Right arm, Patient Position: Sitting, Cuff Size: Adult Regular)   Pulse 60   Temp 98.7  F (37.1  C) (Tympanic)    "Resp 16   Ht 1.778 m (5' 10\")   Wt 93.9 kg (207 lb)   BMI 29.70 kg/m   Body mass index is 29.7 kg/m .    General Appearance: No acute distress.    Right shoulder range of motion is full in all planes.   Sensory is: Intact  Neer's Sign: negative.  Cross body adduction:  negative.  Bicipital testing: positive.  Lala's test:positive.      IMPRESSION/PLAN:    He is responding appropriately to the rehab program.  He was taken off any work restrictions due to his presentation today and agreed with this.  He signed the workability form indicating so.  I anticipate full recovery of his condition likely within the next 3-4 physical therapy sessions.  I will not require a follow up appointment for this reason, but he is instructed to contact us if any flare-up or set-back occurs.  Otherwise we will assume MMI and he will be discharged from this work injury.  He had full understanding and had no further questions.          Ashish Yeung DC  Director - Occupational Medicine Department  [facility] and [address] [address]  Phone [phone]  Fax [phone]    Disclaimer:  This note consists of words and symbols derived from keyboarding, dictation, or using voice recognition software. As a result, there may be errors in the script that have gone undetected. Please consider this when interpreting information found in this note.    4:12 PM 7/17/2019    "

## 2019-07-17 NOTE — NURSING NOTE
Ellis Scott is a 58 year old male presenting for a work comp follow up of his right shoulder injury. DATE OF INJURY: 5/7/19.  No LMP for male patient.  Medication Reconciliation: complete    Lakia Tran LPN  7/17/2019 3:03 PM

## 2019-07-22 ENCOUNTER — HOSPITAL ENCOUNTER (OUTPATIENT)
Dept: PHYSICAL THERAPY | Facility: OTHER | Age: 59
Setting detail: THERAPIES SERIES
End: 2019-07-22
Attending: CHIROPRACTOR
Payer: OTHER MISCELLANEOUS

## 2019-07-22 PROCEDURE — 97110 THERAPEUTIC EXERCISES: CPT | Mod: GP

## 2019-07-22 PROCEDURE — 97140 MANUAL THERAPY 1/> REGIONS: CPT | Mod: GP

## 2019-07-22 PROCEDURE — 97035 APP MDLTY 1+ULTRASOUND EA 15: CPT | Mod: GP

## 2019-07-25 ENCOUNTER — HOSPITAL ENCOUNTER (OUTPATIENT)
Dept: PHYSICAL THERAPY | Facility: OTHER | Age: 59
Setting detail: THERAPIES SERIES
End: 2019-07-25
Attending: CHIROPRACTOR
Payer: OTHER MISCELLANEOUS

## 2019-07-25 PROCEDURE — 97110 THERAPEUTIC EXERCISES: CPT | Mod: GP | Performed by: PHYSICAL THERAPIST

## 2019-07-29 ENCOUNTER — HOSPITAL ENCOUNTER (OUTPATIENT)
Dept: PHYSICAL THERAPY | Facility: OTHER | Age: 59
Setting detail: THERAPIES SERIES
End: 2019-07-29
Attending: CHIROPRACTOR
Payer: OTHER MISCELLANEOUS

## 2019-07-29 PROCEDURE — 97110 THERAPEUTIC EXERCISES: CPT | Mod: GP

## 2019-08-01 ENCOUNTER — HOSPITAL ENCOUNTER (OUTPATIENT)
Dept: PHYSICAL THERAPY | Facility: OTHER | Age: 59
Setting detail: THERAPIES SERIES
End: 2019-08-01
Attending: CHIROPRACTOR
Payer: OTHER MISCELLANEOUS

## 2019-08-01 PROCEDURE — 97110 THERAPEUTIC EXERCISES: CPT | Mod: GP | Performed by: PHYSICAL THERAPIST

## 2019-08-12 ENCOUNTER — OFFICE VISIT (OUTPATIENT)
Dept: INTERNAL MEDICINE | Facility: OTHER | Age: 59
End: 2019-08-12
Attending: INTERNAL MEDICINE
Payer: COMMERCIAL

## 2019-08-12 VITALS
TEMPERATURE: 98.1 F | BODY MASS INDEX: 29.87 KG/M2 | DIASTOLIC BLOOD PRESSURE: 82 MMHG | RESPIRATION RATE: 18 BRPM | WEIGHT: 208.2 LBS | SYSTOLIC BLOOD PRESSURE: 112 MMHG | HEART RATE: 68 BPM

## 2019-08-12 DIAGNOSIS — R04.0 EPISTAXIS: Primary | ICD-10-CM

## 2019-08-12 PROCEDURE — 99213 OFFICE O/P EST LOW 20 MIN: CPT | Performed by: INTERNAL MEDICINE

## 2019-08-12 RX ORDER — METOPROLOL SUCCINATE 50 MG/1
50 TABLET, EXTENDED RELEASE ORAL DAILY
Refills: 11 | COMMUNITY
Start: 2019-08-12 | End: 2020-12-10

## 2019-08-12 RX ORDER — CLOPIDOGREL BISULFATE 75 MG/1
75 TABLET ORAL DAILY
Refills: 11 | COMMUNITY
Start: 2019-08-12 | End: 2020-12-10

## 2019-08-12 RX ORDER — AMLODIPINE BESYLATE 5 MG/1
5 TABLET ORAL DAILY
Refills: 11 | COMMUNITY
Start: 2019-08-12 | End: 2020-12-10

## 2019-08-12 ASSESSMENT — ENCOUNTER SYMPTOMS
EYES NEGATIVE: 1
ENDOCRINE NEGATIVE: 1
ALLERGIC/IMMUNOLOGIC NEGATIVE: 1
CONSTITUTIONAL NEGATIVE: 1

## 2019-08-12 NOTE — PROGRESS NOTES
Chief Complaint:  Nosebleeds.    HPI: He is in today with complaint of nosebleeds.  Is always on the left side.  He has had 6 of them since January.  He is able to get to stop on his own but he did have to go to the emergency room a month or so ago.  He had another nosebleed again this morning.  He is on dual antiplatelet therapy following his cardiac stents.    Past Medical History:   Diagnosis Date     Hearing loss     No Comments Provided     Vasectomy status     No Comments Provided       Past Surgical History:   Procedure Laterality Date     COLONOSCOPY      2009,normal--10 year followup     OTHER SURGICAL HISTORY      62602,INNER EAR SURGERY, x several     VASECTOMY      No Comments Provided       No Known Allergies    Current Outpatient Medications   Medication Sig Dispense Refill     amLODIPine (NORVASC) 5 MG tablet Take 1 tablet (5 mg) by mouth daily  11     aspirin (ASA) 81 MG tablet Take 2 tablets (162 mg) by mouth daily       clopidogrel (PLAVIX) 75 MG tablet Take 1 tablet (75 mg) by mouth daily  11     metoprolol succinate ER (TOPROL-XL) 50 MG 24 hr tablet Take 1 tablet (50 mg) by mouth daily  11     nitroGLYcerin (NITROSTAT) 0.4 MG sublingual tablet TK 0.4MG SUBLINGUALLY Q 5 MINUTES X3 DOSES PRN FOR CHEST PAIN.  1     rosuvastatin (CRESTOR) 40 MG tablet Take 1 tablet by mouth At Bedtime       triamcinolone (KENALOG) 0.1 % ointment APPLY EXTERNALLY TO THE AFFECTED AREA FOUR TIMES DAILY 80 g 0       Review of Systems   Constitutional: Negative.    Eyes: Negative.    Endocrine: Negative.    Allergic/Immunologic: Negative.        Physical Exam   Constitutional: He appears well-developed and well-nourished. No distress.   HENT:   In the left nares on the Kiesselbach plexus area he did have a 2 mm lesion/ulceration.  This was cauterized with silver nitrate.   Skin: He is not diaphoretic.   Nursing note and vitals reviewed.      Assessment:        ICD-10-CM    1. Epistaxis R04.0 OTOLARYNGOLOGY REFERRAL        Plan: Because he has recurrent epistaxis, and is on dual antiplatelet therapy, I do think that ENT evaluation and review is necessary.  I am not sure that the silver nitrate will resolve his recurrent epistaxis.  Consultation with Dr. Kauffman is requested.  Follow-up sooner if there are problems.

## 2019-08-12 NOTE — NURSING NOTE
"The patient is here today to be seen for nose bleeds and a sore inside his left side of his nose.  Martina Burr LPN on 8/12/2019 at 1:52 PM  Chief Complaint   Patient presents with     Nose Bleeds       Initial /82 (BP Location: Right arm, Patient Position: Sitting, Cuff Size: Adult Regular)   Pulse 68   Temp 98.1  F (36.7  C) (Tympanic)   Resp 18   Wt 94.4 kg (208 lb 3.2 oz)   BMI 29.87 kg/m   Estimated body mass index is 29.87 kg/m  as calculated from the following:    Height as of 7/17/19: 1.778 m (5' 10\").    Weight as of this encounter: 94.4 kg (208 lb 3.2 oz).  Medication Reconciliation: complete    Martina Burr LPN    "

## 2019-09-04 NOTE — PROGRESS NOTES
Outpatient Physical Therapy Discharge Note     Patient: Ellis Scott  : 1960    Beginning/End Dates:  19 to 19    Referring Provider: Dr ROHINI Yeung    Therapy Diagnosis: Acute pain of right shoulder M25.511; Labral tear of shoulder, right, initial encounter S43.431A      Plan:  Discharge from therapy.    Discharge:   Pt has not returned to physical therapy after a trial of self mgmt techniques indicating that he is doing well with HEP. Please refer to pt's chart for most recent information on objective measurements, goals or any additional information. This is an unplanned DC    Reason for Discharge: No further expectation of progress.  Patient chooses to discontinue therapy.    Discharge Plan: Patient to continue home program.

## 2019-09-04 NOTE — ADDENDUM NOTE
Encounter addended by: Catarino Butler, PT on: 9/4/2019 10:33 AM   Actions taken: Sign clinical note, Episode resolved

## 2019-09-11 ENCOUNTER — THERAPY VISIT (OUTPATIENT)
Dept: CHIROPRACTIC MEDICINE | Facility: OTHER | Age: 59
End: 2019-09-11
Attending: CHIROPRACTOR
Payer: COMMERCIAL

## 2019-09-11 DIAGNOSIS — M54.50 ACUTE MIDLINE LOW BACK PAIN WITHOUT SCIATICA: ICD-10-CM

## 2019-09-11 DIAGNOSIS — M62.838 SPASM OF MUSCLE: ICD-10-CM

## 2019-09-11 DIAGNOSIS — M99.04 SEGMENTAL AND SOMATIC DYSFUNCTION OF SACRAL REGION: Primary | ICD-10-CM

## 2019-09-11 PROCEDURE — 99213 OFFICE O/P EST LOW 20 MIN: CPT | Mod: 25 | Performed by: CHIROPRACTOR

## 2019-09-11 PROCEDURE — 97014 ELECTRIC STIMULATION THERAPY: CPT | Performed by: CHIROPRACTOR

## 2019-09-11 PROCEDURE — 98940 CHIROPRACT MANJ 1-2 REGIONS: CPT | Mod: AT | Performed by: CHIROPRACTOR

## 2019-09-11 NOTE — PROGRESS NOTES
PATIENT:  Ellis Scott is a 59 year old male presenting for lower back    PROBLEM:   Date of Initial Visit for this Episode:  9/11/2019    Visit #1    SUBJECTIVE / HPI: Patient presents with primary complaints of lower back pain.  Patient states that he is unsure of the exact cause of his lower back pain.  Patient reports that he recently was at his [facility] party and reports doing somersaults which he believes to be contributing factor.  Patient reports symptoms worsened yesterday to their current levels.  Due to the severity of the patient's pain and the fact that he has a golf tournament this weekend patient contacted our office for further evaluation and treatment of symptoms  Description and onset:  Duration and Frequency of Pain: yesterday and frequent  Radiation of pain: No  Pain rated at it's worst: 9/10  Pain rated currently:  7/10  Pain course: Worse  Worse with: General movement  Improved by:  Heat and Tylenol  Additional Features: Unremarkable  Other Health Care Providers seen for this: Patient reports utilizing chiropractic care for similar complaints in the past.  Patient did not name specific chiropractors  Previous treatment: Chiropractic  Previous injury: Patient notes prior history of back aches however no significant trauma or injuries          See flowsheets in chart for details.  9/11/2019    Oswestry (MICAELA) Questionnaire    OSWESTRY DISABILITY INDEX 9/11/2019   Count 9   Sum 14   Oswestry Score (%) 31.11   Some recent data might be hidden        Functional limitations:  Transitional movements,         Past D.C. Care: yes, helpful       Health History as reported by the patient: Excellent and Good      PAST MEDICAL HISTORY:  Past Medical History:   Diagnosis Date     Hearing loss     No Comments Provided     Vasectomy status     No Comments Provided       PAST SURGICAL HISTORY:  Past Surgical History:   Procedure Laterality Date     COLONOSCOPY      2009,normal--10 year followup     OTHER  SURGICAL HISTORY      75679,INNER EAR SURGERY, x several     VASECTOMY      No Comments Provided       ALLERGIES:  No Known Allergies    CURRENT MEDICATIONS:  Current Outpatient Medications   Medication Sig Dispense Refill     amLODIPine (NORVASC) 5 MG tablet Take 1 tablet (5 mg) by mouth daily  11     aspirin (ASA) 81 MG tablet Take 2 tablets (162 mg) by mouth daily       clopidogrel (PLAVIX) 75 MG tablet Take 1 tablet (75 mg) by mouth daily  11     metoprolol succinate ER (TOPROL-XL) 50 MG 24 hr tablet Take 1 tablet (50 mg) by mouth daily  11     nitroGLYcerin (NITROSTAT) 0.4 MG sublingual tablet TK 0.4MG SUBLINGUALLY Q 5 MINUTES X3 DOSES PRN FOR CHEST PAIN.  1     rosuvastatin (CRESTOR) 40 MG tablet Take 1 tablet by mouth At Bedtime       triamcinolone (KENALOG) 0.1 % ointment APPLY EXTERNALLY TO THE AFFECTED AREA FOUR TIMES DAILY 80 g 0       SOCIAL HISTORY:  Marital Status: .  Children: yes.  Occupation: Works for [employer].  Alcohol use:yes.  Tobacco use: Smoker: former.      FAMILY HISTORY:  Family History   Problem Relation Age of Onset     Heart Disease Mother         Heart Disease     Other - See Comments Other         Parkinson's disease     Other - See Comments Other         Parkinson's disease     Prostate Cancer Other         Cancer-prostate     Heart Disease Brother         Heart Disease,5 vessel bypass       Patient Active Problem List   Diagnosis     Contact dermatitis and eczema     Mixed hyperlipidemia     Mixed conductive and sensorineural hearing loss, bilateral     Varices of other sites     Wheezing     History of non-ST elevation myocardial infarction (NSTEMI) - 12/26/2018 - Tx to [facility]     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  No shortness of breath, chest pain, or rashes.    OBJECTIVE:    DIAGNOSTICS:  No current spinal imaging taken.     PHYSICAL EXAM:     GENERAL APPEARANCE: healthy, alert, moderate distress and patient was standing and moving around during the majority of today's visit   GAIT: NORMAL      MUSCULOSKELETAL:   Posture: Generally fair.  Patient does exhibit moderate loss of lumbar lordosis, bilateral rounding of shoulders and upper thoracic kyphotic curvature does appear accentuated.  Iliac crest and shoulders do appear level bilaterally.  Patient does not appear antalgic.  Gait:  unremarkable.         Thoracic and Lumbar performed actively and measured approximately  ROM:  50/60 flexion 30/60 extension with pain   40/45 RLF    30/45 LLF   45/45 RR      45/45 LR    -Kemps: negative   +Leg length inequality: negative De[place]ield -; Restricted right heel to buttock   Other:  -Ely's, -Nachlas    +Tenderness: Elicited over the sacral base bilaterally however right side appears predominant following the right PSIS  +Muscle spasm: Lumbar paraspinals bilaterally, left quadratus lumborum, gluteus medius right side  +Joint asymmetry and restriction: Sacrum, base posterior    ASSESSMENT: Ellis Scott is a 59 year old male presenting with primary complaints of lower back pain following recent shelter party as well as following yesterday morning.  Patient does appear to be a good candidate for chiropractic care and I do anticipate favorable response.  Patient's goal is to try to play golf by this weekend.  Because of this I am recommending aggressive course of action with 3 times a week visits for the first week followed by 1-2 visits for the following weeks.  We will also try to provide patient with effective home exercises to supplement adjustments.     1. Segmental and somatic dysfunction of sacral region    2. Acute midline low back pain without sciatica     3. Spasm of muscle        PLAN    Evaluation and Management:  26638 Moderate exam established patient 20 min    Procedures:  Modalities:  50706: Electrical Stim:  0-10HZ at 13 internsity for 15 minutes,  Location:lumbar spine    CMT:  96890 Chiropractic manipulative treatment 1-2 regions performed   Pelvis: Diversified, Sacrum , Side posture    Therapeutic procedures:  None    Response to Treatment  Reduction in symptoms as reported by patient    Prognosis: Good    9/11/2019 Plan of Care:  6-8 visits of Chiropractic Care including Spinal Adjustments and/or physiotherapy and active rehabilitation, to include exercises in the office and/or at home to meet care plan goals.     Frequency: 3xweek for 1 week followed by 1-2x week for up to 2 weeks. A reevaluation would be clinically appropriate in 6-8 visits, to determine progress and further course of care.    POC discussed and patient agreeable to plan of care.      9/11/2019 Goals:      Patient will report improved pain.   Patient will report able to play golf this weekend.   Patient will demonstrate an improved ability to complete Activities of Daily Living  as shown by a reported 20% reduced score on  back index.    Patient will demonstrate improved ROM.        INSTRUCTIONS   ice 20 minutes every other hour as needed, heat 15 minutes every other hour as needed and rest    Follow-up:  Return to care in 1 day.        Disclaimer: This note consists of symbols derived from keyboarding, dictation and/or voice recognition software. As a result, there may be errors in the script that have gone undetected. Please consider this when interpreting information found in this chart.

## 2019-09-12 ENCOUNTER — THERAPY VISIT (OUTPATIENT)
Dept: CHIROPRACTIC MEDICINE | Facility: OTHER | Age: 59
End: 2019-09-12
Attending: CHIROPRACTOR
Payer: COMMERCIAL

## 2019-09-12 DIAGNOSIS — M99.04 SEGMENTAL AND SOMATIC DYSFUNCTION OF SACRAL REGION: Primary | ICD-10-CM

## 2019-09-12 DIAGNOSIS — M54.50 ACUTE MIDLINE LOW BACK PAIN WITHOUT SCIATICA: ICD-10-CM

## 2019-09-12 PROCEDURE — 98940 CHIROPRACT MANJ 1-2 REGIONS: CPT | Mod: AT | Performed by: CHIROPRACTOR

## 2019-09-12 NOTE — PROGRESS NOTES
Visit #:  2    Subjective:  Ellis Scott is a 59 year old male who is seen in f/u up for:        Segmental and somatic dysfunction of sacral region  Acute midline low back pain without sciatica.     Since last visit on 9/11/2019,  Ellis Scott reports:    Area of chief complaint:  Lumbar :  Symptoms are graded at 3/10. The quality is described as tingling.  She notes improvement of symptoms.  Last night patient noted poor sleep due to restless sensation.  Patient still has mild difficulty with transitioning motions and sitting for extended periods however overall he feels he is making progress at this time       Objective:  The following was observed:    P: palpatory tendernessMinimally present over the:  Sacral base  A: static palpation demonstrates intersegmental asymmetry , pelvis  R: motion palpation notes restricted motion, Sacrum   T: Localized taut and tender fibers present of the lumbar paraspinals and left quadratus lumborum.  These do not appear spastic currently.    Segmental spinal dysfunction/restrictions found at:  :  Sacrum Base posterior.      Assessment: Patient is showing improvement following yesterday's treatment.  Patient will be competing in a golf tournament this weekend which I do believe will cause symptoms to worsen somewhat which is why I am recommending follow-up care on Monday.  Due to upcoming golf tournament patient may require one additional visit tomorrow.    Diagnoses:      1. Segmental and somatic dysfunction of sacral region    2. Acute midline low back pain without sciatica        Patient's condition:  Patient had restrictions pre-manipulation and Patient symptoms are gradually improving    Treatment effectiveness:  Post manipulation there is better intersegmental movement and Patient claims to feel looser post manipulation      Procedures:  CMT:  62730 Chiropractic manipulative treatment 1-2 regions performed   Pelvis: Diversified, Sacrum , Side posture    Modalities:  None  performed this visit    Therapeutic procedures:  None    Response to Treatment  Reduction in symptoms as reported by patient    Prognosis: Good    Progress towards Goals: Patient is making progress towards the goal.     Recommendations:    Instructions:ice 20 minutes every other hour as needed, heat 15 minutes every other hour as needed and walk 10 minutes    Follow-up:  Return to care in 5 days.

## 2019-09-16 ENCOUNTER — THERAPY VISIT (OUTPATIENT)
Dept: CHIROPRACTIC MEDICINE | Facility: OTHER | Age: 59
End: 2019-09-16
Attending: CHIROPRACTOR
Payer: COMMERCIAL

## 2019-09-16 DIAGNOSIS — M54.50 ACUTE MIDLINE LOW BACK PAIN WITHOUT SCIATICA: ICD-10-CM

## 2019-09-16 DIAGNOSIS — M99.04 SEGMENTAL AND SOMATIC DYSFUNCTION OF SACRAL REGION: Primary | ICD-10-CM

## 2019-09-16 PROCEDURE — 98940 CHIROPRACT MANJ 1-2 REGIONS: CPT | Mod: AT | Performed by: CHIROPRACTOR

## 2019-09-16 NOTE — PROGRESS NOTES
Visit #:  3    Subjective:  Ellis Scott is a 59 year old male who is seen in f/u up for:        Segmental and somatic dysfunction of sacral region  Acute midline low back pain without sciatica.     Since last visit on 9/12/2019,  Ellis Scott reports:    Area of chief complaint:  Lumbar :  Symptoms are graded at 1-6/10.  Patient reports being able to golf this weekend without interference due to pain.  Patient reports that riding the golf cart on bumpy surfaces did cause symptoms to flare.  Patient also notes that turning over in bed will still cause some low back pain which she ranks at 6 out of 10.  Patient reports that this is been quite infrequent however and feels overall that he is making improvement at this time.    Patient notes that he will be trying to walk in the woods over the next few days       Objective:  The following was observed:    P: palpatory tendernessElicited left PSIS:    A: static palpation demonstrates intersegmental asymmetry , pelvis  R: motion palpation notes restricted motion, Sacrum   T: Mild hypertonicity noted of the left quadratus lumborum    Segmental spinal dysfunction/restrictions found at:  :  Sacrum Extension restriction.      Assessment: Patient continues to show improvement.  I am very pleased that the patient was able to go golfing this weekend and that this activity did not cause symptoms to worsen.  As the patient will be going grouse hunting/walking in the woods this week I do anticipate this will cause symptoms to aggravate somewhat which may require one additional visit this week.  However barring this acute exacerbation I do anticipate that patient's care will be able to be tapered to once a week for the next 2 weeks.    Diagnoses:      1. Segmental and somatic dysfunction of sacral region    2. Acute midline low back pain without sciatica        Patient's condition:  Patient had restrictions pre-manipulation and Patient symptoms are gradually  improving    Treatment effectiveness:  Post manipulation there is better intersegmental movement, Patient claims to feel looser post manipulation, Tenderness is reducing, Muscle spasm is reducing and Patient is able to do some ADL's with less pain      Procedures:  CMT:  40867 Chiropractic manipulative treatment 1-2 regions performed   Pelvis: Diversified, Sacrum , Side posture    Modalities:  None performed this visit    Therapeutic procedures:  None    Response to Treatment  Reduction in symptoms as reported by patient    Prognosis: Good    Progress towards Goals: Patient is making progress towards the goal.     Recommendations:    Instructions:Continue to perform activities as tolerated and monitor symptoms    Follow-up:  Return to care in 1 week.

## 2019-09-23 ENCOUNTER — THERAPY VISIT (OUTPATIENT)
Dept: CHIROPRACTIC MEDICINE | Facility: OTHER | Age: 59
End: 2019-09-23
Attending: CHIROPRACTOR
Payer: COMMERCIAL

## 2019-09-23 DIAGNOSIS — M99.04 SEGMENTAL AND SOMATIC DYSFUNCTION OF SACRAL REGION: Primary | ICD-10-CM

## 2019-09-23 DIAGNOSIS — M54.50 ACUTE MIDLINE LOW BACK PAIN WITHOUT SCIATICA: ICD-10-CM

## 2019-09-23 PROCEDURE — 98940 CHIROPRACT MANJ 1-2 REGIONS: CPT | Mod: AT | Performed by: CHIROPRACTOR

## 2019-09-23 NOTE — PROGRESS NOTES
Visit #:  4    Subjective:  Ellis Scott is a 59 year old male who is seen in f/u up for:        Segmental and somatic dysfunction of sacral region  Acute midline low back pain without sciatica.     Since last visit on 9/16/2019,  Ellis Scott reports:    Area of chief complaint:  Lumbar :  Symptoms are graded at 0/10.  Patient states that he is feeling quite a bit better.  Patient does admit however that he has not been pushing his low back for fear that he would aggravate his symptoms.  Patient reports that he feels comfortable enough at this point to go golfing and hiking in the woods without fear of aggravating his symptoms.  Overall patient is quite pleased with progress under our care         Objective:  The following was observed:    P: palpatory tendernessMinimally present over the sacral base bilaterally:    A: static palpation demonstrates intersegmental asymmetry , pelvis  R: motion palpation notes restricted motion, Sacrum   T: {Hypertonic muscles noted of the lumbar paraspinals and left quadratus lumborum    Segmental spinal dysfunction/restrictions found at:  :  Sacrum Extension restriction.      Assessment: Patient appears to have reached goals for this course of treatment.  No follow-up care recommended at this time barring acute exacerbation of symptoms.  Patient was in agreement with this course of care.    Diagnoses:      1. Segmental and somatic dysfunction of sacral region    2. Acute midline low back pain without sciatica        Patient's condition:  Patient had restrictions pre-manipulation and Patient is noticing a decrease in their symptoms    Treatment effectiveness:  Post manipulation there is better intersegmental movement, Patient claims to feel looser post manipulation, Symptoms appear to be decreasing, Tenderness is reducing, Range of motion is gradually improving and ADL are improving      Procedures:  CMT:  67330 Chiropractic manipulative treatment 1-2 regions performed   Pelvis:  Diversified, Sacrum , Side posture    Modalities:  None performed this visit    Therapeutic procedures:  None    Response to Treatment  Reduction in symptoms as reported by patient    Prognosis: Good    Progress towards Goals: Patient has met the goal.     Recommendations:    Instructions:Continue to monitor symptoms and perform activities as tolerated    Follow-up:  Patient has met the goals of treament.  Discharge from care.

## 2019-10-29 ENCOUNTER — THERAPY VISIT (OUTPATIENT)
Dept: CHIROPRACTIC MEDICINE | Facility: OTHER | Age: 59
End: 2019-10-29
Attending: CHIROPRACTOR
Payer: COMMERCIAL

## 2019-10-29 ENCOUNTER — ALLIED HEALTH/NURSE VISIT (OUTPATIENT)
Dept: FAMILY MEDICINE | Facility: OTHER | Age: 59
End: 2019-10-29
Attending: INTERNAL MEDICINE
Payer: COMMERCIAL

## 2019-10-29 DIAGNOSIS — M54.50 ACUTE MIDLINE LOW BACK PAIN WITHOUT SCIATICA: ICD-10-CM

## 2019-10-29 DIAGNOSIS — M99.03 SEGMENTAL AND SOMATIC DYSFUNCTION OF LUMBAR REGION: Primary | ICD-10-CM

## 2019-10-29 DIAGNOSIS — Z23 NEED FOR PROPHYLACTIC VACCINATION AND INOCULATION AGAINST INFLUENZA: Primary | ICD-10-CM

## 2019-10-29 PROCEDURE — 90686 IIV4 VACC NO PRSV 0.5 ML IM: CPT

## 2019-10-29 PROCEDURE — 90471 IMMUNIZATION ADMIN: CPT

## 2019-10-29 PROCEDURE — 98940 CHIROPRACT MANJ 1-2 REGIONS: CPT | Mod: AT | Performed by: CHIROPRACTOR

## 2019-10-29 NOTE — PROGRESS NOTES
"Visit #:  1/3  New Episode 10/29/19    Subjective:  Ellis Scott is a 59 year old male who is seen in f/u up for:        Segmental and somatic dysfunction of lumbar region  Acute midline low back pain without sciatica.     Since last visit on 9/23/2019,  Ellis Scott reports:Over the weekend \"over-doing it.\"  Patient describes doing various tasks of yard work which included but not limited to raking of leaves, and climbing up and down ladders.  Patient reports not trying much to treat symptoms at home.  However patient notes that when symptoms had subsided somewhat he attempted to utilize his home elliptical machine.  Patient notes that after walking on the elliptical machine for a short duration of time symptoms began to improve.    No traumatic events noted preceding flareup.  Patient denies any radicular complaints at this time.  No other fevers, cough, chills, or other symptoms like these noted.    Area of chief complaint:  Lumbar :  Symptoms are graded at 0-4/10. The quality is described as sharp, stabbing.  Patient notes pain was worsened over the weekend and has been improving since initial flare up. Patient is concerned symptoms will affect his ability to participate in deer hunting opener.       Objective:  The following was observed:  Oswestry (MICAELA) Questionnaire    OSWESTRY DISABILITY INDEX 10/29/2019   Count 9   Sum 0   Oswestry Score (%) 0   Some recent data might be hidden      Patient notes some difficulty with sitting to standing movements.    Lumbar AROM: Mild restriction in extension and left lateral flexion by less than 5 degrees.  No pain is noted however patient states that extension and left lateral flexion motions are somewhat tight and uncomfortable    P: palpatory tendernessMild over the left side of L5:    A: static palpation demonstrates intersegmental asymmetry , lumbar  R: motion palpation notes restricted motion, L5   T: muscle spasm at level(s): Left quadratus lumborum.  Lumbar " paraspinals bilaterally left side predominant:      Segmental spinal dysfunction/restrictions found at:  :  L5 Left lateral flexion restricted and Extension restriction.      Assessment: Patient appears to have experienced mild flareup of low back pain symptoms.  Patient has been under our care with similar complaints and responds favorably with short duration.  Current plan of care for this episode to include up to 3 chiropractic visits over the next 4 weeks.    Diagnoses:      1. Segmental and somatic dysfunction of lumbar region    2. Acute midline low back pain without sciatica        Patient's condition:  Patient had restrictions pre-manipulation and symptoms worsened due to yard work over the weekend however they are improving at this time    Treatment effectiveness:  Post manipulation there is better intersegmental movement and Patient claims to feel looser post manipulation      Procedures:  CMT:  86909 Chiropractic manipulative treatment 1-2 regions performed   Lumbar: Diversified, L5, Side posture    Modalities:  None performed this visit    Therapeutic procedures:  None    Response to Treatment  Reduction in symptoms as reported by patient    Prognosis: Good    Progress towards Goals: Goal: Reduce low back pain  Improve extension and left lateral flexion lumbar motions  Patient will be able to go from sit to stand without painful limitations     Recommendations:    Instructions:ice 20 minutes every other hour as needed, heat 15 minutes every other hour as needed and Continue to utilize elliptical machine as tolerated    Follow-up:  Recommendation 2 additional visits.

## 2019-10-29 NOTE — PATIENT INSTRUCTIONS
ice 20 minutes every other hour as needed, heat 15 minutes every other hour as needed and Continue to utilize elliptical machine as tolerated

## 2019-12-04 ENCOUNTER — TRANSFERRED RECORDS (OUTPATIENT)
Dept: HEALTH INFORMATION MANAGEMENT | Facility: OTHER | Age: 59
End: 2019-12-04

## 2020-03-11 ENCOUNTER — HEALTH MAINTENANCE LETTER (OUTPATIENT)
Age: 60
End: 2020-03-11

## 2020-12-08 ENCOUNTER — TELEPHONE (OUTPATIENT)
Dept: INTERNAL MEDICINE | Facility: OTHER | Age: 60
End: 2020-12-08

## 2020-12-08 DIAGNOSIS — L25.9 CONTACT DERMATITIS AND ECZEMA: ICD-10-CM

## 2020-12-08 RX ORDER — TRIAMCINOLONE ACETONIDE 1 MG/G
OINTMENT TOPICAL
Qty: 80 G | Refills: 0 | Status: CANCELLED | OUTPATIENT
Start: 2020-12-08

## 2020-12-08 NOTE — TELEPHONE ENCOUNTER
Patient was contacted and an appointment was made for Thursday in one of Dr. Jerez's available spots.  Madeline Maciel on 12/8/2020 at 4:07 PM

## 2020-12-08 NOTE — TELEPHONE ENCOUNTER
States he is needing a prescription for triamcinolone ointment sent to [facility]

## 2020-12-08 NOTE — TELEPHONE ENCOUNTER
Patient is wanting a refill on his triamcinolone ointment as he has develop a rash on the back of his hands, states that he has tried  Other OTC lotions and ointments and nothing will work. Has used this before for the same thing and it did help. Wondering about the refill or does he need to come in?     Would like a call back when/if done.   Nini Taylor LPN...................12/8/2020   3:28 PM

## 2020-12-08 NOTE — TELEPHONE ENCOUNTER
I haven't seen patient in almost 2 years. Recommend appointment.   There are openings later in the week.     Vladislav Jerez MD

## 2020-12-10 ENCOUNTER — OFFICE VISIT (OUTPATIENT)
Dept: INTERNAL MEDICINE | Facility: OTHER | Age: 60
End: 2020-12-10
Attending: INTERNAL MEDICINE
Payer: COMMERCIAL

## 2020-12-10 VITALS
HEART RATE: 61 BPM | RESPIRATION RATE: 16 BRPM | OXYGEN SATURATION: 98 % | TEMPERATURE: 95.8 F | SYSTOLIC BLOOD PRESSURE: 112 MMHG | HEIGHT: 72 IN | DIASTOLIC BLOOD PRESSURE: 72 MMHG | BODY MASS INDEX: 29.15 KG/M2 | WEIGHT: 215.2 LBS

## 2020-12-10 DIAGNOSIS — M72.0 DUPUYTREN'S CONTRACTURE OF LEFT HAND: ICD-10-CM

## 2020-12-10 DIAGNOSIS — Z12.5 SCREENING PSA (PROSTATE SPECIFIC ANTIGEN): ICD-10-CM

## 2020-12-10 DIAGNOSIS — Z11.59 NEED FOR HEPATITIS C SCREENING TEST: ICD-10-CM

## 2020-12-10 DIAGNOSIS — Z95.1 S/P CABG X 3: ICD-10-CM

## 2020-12-10 DIAGNOSIS — Z12.11 COLON CANCER SCREENING: ICD-10-CM

## 2020-12-10 DIAGNOSIS — Z23 NEED FOR IMMUNIZATION AGAINST INFLUENZA: ICD-10-CM

## 2020-12-10 DIAGNOSIS — H90.6 MIXED CONDUCTIVE AND SENSORINEURAL HEARING LOSS, BILATERAL: ICD-10-CM

## 2020-12-10 DIAGNOSIS — L25.9 CONTACT DERMATITIS AND ECZEMA: ICD-10-CM

## 2020-12-10 DIAGNOSIS — E78.2 MIXED HYPERLIPIDEMIA: ICD-10-CM

## 2020-12-10 DIAGNOSIS — I25.2 HISTORY OF NON-ST ELEVATION MYOCARDIAL INFARCTION (NSTEMI): ICD-10-CM

## 2020-12-10 DIAGNOSIS — Z00.00 ANNUAL PHYSICAL EXAM: Primary | ICD-10-CM

## 2020-12-10 PROBLEM — I97.89 POSTOPERATIVE ATRIAL FIBRILLATION (H): Status: RESOLVED | Noted: 2019-02-07 | Resolved: 2020-12-10

## 2020-12-10 PROBLEM — R03.1 LOW BLOOD PRESSURE READING: Status: RESOLVED | Noted: 2019-02-07 | Resolved: 2020-12-10

## 2020-12-10 PROBLEM — Z79.02 PLATELET INHIBITION DUE TO PLAVIX: Status: RESOLVED | Noted: 2019-01-18 | Resolved: 2020-12-10

## 2020-12-10 PROBLEM — I48.91 POSTOPERATIVE ATRIAL FIBRILLATION (H): Status: RESOLVED | Noted: 2019-02-07 | Resolved: 2020-12-10

## 2020-12-10 LAB
CHOLEST SERPL-MCNC: 131 MG/DL
HDLC SERPL-MCNC: 40 MG/DL (ref 23–92)
LDLC SERPL CALC-MCNC: 72 MG/DL
NONHDLC SERPL-MCNC: 91 MG/DL
PSA SERPL-ACNC: 0.49 NG/ML
TRIGL SERPL-MCNC: 94 MG/DL

## 2020-12-10 PROCEDURE — 80061 LIPID PANEL: CPT | Mod: ZL | Performed by: INTERNAL MEDICINE

## 2020-12-10 PROCEDURE — 90471 IMMUNIZATION ADMIN: CPT | Performed by: INTERNAL MEDICINE

## 2020-12-10 PROCEDURE — 86803 HEPATITIS C AB TEST: CPT | Mod: ZL | Performed by: INTERNAL MEDICINE

## 2020-12-10 PROCEDURE — 99396 PREV VISIT EST AGE 40-64: CPT | Mod: 25 | Performed by: INTERNAL MEDICINE

## 2020-12-10 PROCEDURE — 84153 ASSAY OF PSA TOTAL: CPT | Mod: ZL | Performed by: INTERNAL MEDICINE

## 2020-12-10 PROCEDURE — 90686 IIV4 VACC NO PRSV 0.5 ML IM: CPT | Performed by: INTERNAL MEDICINE

## 2020-12-10 PROCEDURE — G0103 PSA SCREENING: HCPCS | Mod: ZL | Performed by: INTERNAL MEDICINE

## 2020-12-10 PROCEDURE — 36415 COLL VENOUS BLD VENIPUNCTURE: CPT | Mod: ZL | Performed by: INTERNAL MEDICINE

## 2020-12-10 RX ORDER — TRIAMCINOLONE ACETONIDE 1 MG/G
OINTMENT TOPICAL 4 TIMES DAILY PRN
Qty: 453 G | Refills: 1 | Status: SHIPPED | OUTPATIENT
Start: 2020-12-10 | End: 2024-05-28

## 2020-12-10 RX ORDER — ROSUVASTATIN CALCIUM 40 MG/1
40 TABLET, COATED ORAL AT BEDTIME
Qty: 90 TABLET | Refills: 3 | Status: SHIPPED | OUTPATIENT
Start: 2020-12-10 | End: 2021-12-08

## 2020-12-10 ASSESSMENT — ENCOUNTER SYMPTOMS
LIGHT-HEADEDNESS: 0
DYSURIA: 0
WHEEZING: 0
ABDOMINAL PAIN: 0
DIARRHEA: 0
BRUISES/BLEEDS EASILY: 0
VOMITING: 0
SHORTNESS OF BREATH: 0
DIZZINESS: 0
COUGH: 0
ARTHRALGIAS: 0
PALPITATIONS: 0
CONFUSION: 0
MYALGIAS: 0
AGITATION: 0
FEVER: 0
CHILLS: 0
WOUND: 0
NAUSEA: 0
HEMATURIA: 0

## 2020-12-10 ASSESSMENT — PAIN SCALES - GENERAL: PAINLEVEL: NO PAIN (0)

## 2020-12-10 ASSESSMENT — MIFFLIN-ST. JEOR: SCORE: 1814.89

## 2020-12-10 NOTE — PROGRESS NOTES
Immunization Documentation    Prior to Immunization administration, verified patients identity using patient's name and date of birth. Please see IMMUNIZATIONS  and order for additional information.  Patient / Parent instructed to remain in clinic for 15 minutes and report any adverse reaction to staff immediately.    Was entire vial of medication used? Yes  Vial/Syringe: Syringe    Sheryl Tan LPN  12/10/2020   9:23 AM

## 2020-12-10 NOTE — LETTER
December 14, 2020      Ellis Scott  [address] [place] [address]        Dear ,    We are writing to inform you of your test results.    Labs are stable. Continue current medications.     Vladislav Jerez MD      Resulted Orders   PSA Screen GH   Result Value Ref Range    PSA Screen 0.490 <3.100 ng/mL      Comment:      The DXI Access PSAS WHO assay is a two site immunoenzymatic assay. Assay   values obtained with different assay methods cannot be used interchangeably   due to differences in assay methods and reagent specificity.     Hepatitis C Screen Reflex to HCV RNA Quant and Genotype   Result Value Ref Range    Hepatitis C Antibody Nonreactive NR^Nonreactive      Comment:      Assay performance characteristics have not been established for newborns,   infants, and children     Lipid Panel   Result Value Ref Range    Cholesterol 131 <200 mg/dL    Triglycerides 94 <150 mg/dL    HDL Cholesterol 40 23 - 92 mg/dL    LDL Cholesterol Calculated 72 <100 mg/dL      Comment:      Desirable:       <100 mg/dl    Non HDL Cholesterol 91 <130 mg/dL       If you have any questions or concerns, please call the clinic at the number listed above.       Sincerely,      Vladislav Jerez MD

## 2020-12-10 NOTE — PROGRESS NOTES
Chief Complaint   Patient presents with     Annual Visit     Medication Therapy Management     Derm Problem     hands       Medication Reconciliation complete.   Sheryl Tan LPN  ..................12/10/2020   9:12 AM

## 2020-12-10 NOTE — PATIENT INSTRUCTIONS
Immunization History   Administered Date(s) Administered     Influenza Vaccine IM > 6 months Valent IIV4 10/29/2019, 12/10/2020     TDAP Vaccine (Boostrix) 09/04/2001, 07/17/2017      -- Get your tetanus shot updated - from one of the local pharmacies, at your convenience. --- in 2027.     -- Get the new shingles shot (2 in series) (Shingrix) - from one of the local pharmacies, at your convenience. -- Check cost/coverage.     Pneumococcal Pneumonia vaccines (PCV 13 and PCV 23)     Pneumococcal Conjugate 13 - Valent Vaccine (One time only after age 65).     Pneumococcal 23 - Valent Vaccine -- Two doses (One before age 65 and One After)    -- repeat every 5 years in certain patient populations.     PCV 13should be given prior to PCV 23 -- THEN -- In eight weeks or more, PCV 23 can be given.   If the patient has already received PCV 23, they should not receive PCV 13 for one year.      Labs today.     Medications refilled.     + Cologuard stool blood testing ordered -- mail in sample once you get the kit in the mail.   -- if positive, will need to schedule colonoscopy.   -- if negative, no blood noted, recheck in 3 years.        [facility] - Dr. Feliz   Phone: [phone]       Return in approximately 1 year, or sooner as needed for follow-up with Dr. Jerez.  - Annual Follow-up / Physical    Clinic : [phone]  Appointment line: [phone]

## 2020-12-10 NOTE — PROGRESS NOTES
There are no exam notes on file for this visit.  Nursing note reviewed with patient.  Accuracy and completeness verified.   Mr. Scott is a 60 year old male who:  Patient presents with:  Annual Visit  Medication Therapy Management  Derm Problem: hands      ICD-10-CM    1. Annual physical exam  Z00.00    2. Mixed hyperlipidemia  E78.2 rosuvastatin (CRESTOR) 40 MG tablet     Lipid Panel   3. Need for immunization against influenza  Z23 GH-IMM- FLU VAC PRESRV FREE QUAD SPLIT VIR > 6 MONTHS IM   4. History of non-ST elevation myocardial infarction (NSTEMI) - 12/26/2018 - Tx to [facility]  I25.2 rosuvastatin (CRESTOR) 40 MG tablet   5. 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 Mixed conductive and sensorineural hearing loss, bilateral  H90.6    7. Contact dermatitis and eczema  L25.9 triamcinolone (KENALOG) 0.1 % external ointment   8. Need for hepatitis C screening test  Z11.59 Hepatitis C Screen Reflex to HCV RNA Quant and Genotype   9. Colon cancer screening  Z12.11 ABSTRACT COLOGUARD-NO CHARGE   10. Dupuytren's contracture of left hand  M72.0    11. Screening PSA (prostate specific antigen)  Z12.5 PSA Screen      HPI  Patient presents for annual physical examination as well as follow-up multiple issues.    Mixed hyperlipidemia, continues on Crestor, currently 40 mg daily.  Needs refills.  Check labs.    Flu shot today.    History of non-ST elevation myocardial infarction December 26, 2018.  Subsequently transferred to [facility] where he underwent three-vessel bypass surgery.  Continues on Crestor, aspirin.  No longer on Plavix.    Mixed conductive hearing loss, chronic.  Wears hearing aids.    Has contact dermatitis and eczema, in recurrent issues on his flank.  Has a small patch again.  Needs refills of his Kenalog ointment.    Screening hepatitis C labs today and PSA today.    Colon cancer screening,  "colonoscopy was done 11 years ago and was negative.  Would like to proceed with Cologuard, forms/orders completed today.    Left hand has some Dupuytren's contractures of the fourth and fifth fingers.  He can still open his hand fully.  At this point monitor and follow-up with hand surgery as needed.  Also has a couple cysts on his fingers that are currently painless.  Advised hand surgery follow-up as needed.    Functional Capacity: > 4 METS.   Review of Systems   Constitutional: Negative for chills and fever.   HENT: Negative for congestion and hearing loss.    Eyes: Negative for visual disturbance.   Respiratory: Negative for cough, shortness of breath and wheezing.    Cardiovascular: Negative for chest pain and palpitations.   Gastrointestinal: Negative for abdominal pain, diarrhea, nausea and vomiting.   Endocrine: Negative for cold intolerance and heat intolerance.   Genitourinary: Negative for dysuria and hematuria.   Musculoskeletal: Negative for arthralgias and myalgias.   Skin: Positive for rash (+ occasionally on abdomen, or arms, or legs - resolves with use of triamcinolone). Negative for wound.   Allergic/Immunologic: Negative for immunocompromised state.   Neurological: Negative for dizziness and light-headedness.   Hematological: Does not bruise/bleed easily.   Psychiatric/Behavioral: Negative for agitation and confusion.        Reviewed and updated as needed this visit by Provider   Tobacco  Allergies  Meds  Problems  Med Hx  Surg Hx  Fam Hx          EXAM:   Vitals:    12/10/20 0912   BP: 112/72   BP Location: Right arm   Patient Position: Sitting   Cuff Size: Adult Regular   Pulse: 61   Resp: 16   Temp: 95.8  F (35.4  C)   TempSrc: Tympanic   SpO2: 98%   Weight: 97.6 kg (215 lb 3.2 oz)   Height: 1.83 m (6' 0.05\")       Current Pain Score: No Pain (0)     BP Readings from Last 3 Encounters:   12/10/20 112/72   08/12/19 112/82   07/17/19 116/68      Wt Readings from Last 3 Encounters: " "  12/10/20 97.6 kg (215 lb 3.2 oz)   08/12/19 94.4 kg (208 lb 3.2 oz)   07/17/19 93.9 kg (207 lb)      Estimated body mass index is 29.15 kg/m  as calculated from the following:    Height as of this encounter: 1.83 m (6' 0.05\").    Weight as of this encounter: 97.6 kg (215 lb 3.2 oz).     Physical Exam  Constitutional:       General: He is not in acute distress.     Appearance: He is well-developed. He is not diaphoretic.   HENT:      Head: Normocephalic and atraumatic.   Eyes:      General: No scleral icterus.     Conjunctiva/sclera: Conjunctivae normal.   Neck:      Musculoskeletal: Neck supple.      Vascular: No carotid bruit.   Cardiovascular:      Rate and Rhythm: Normal rate and regular rhythm.      Pulses: Normal pulses.   Pulmonary:      Effort: Pulmonary effort is normal.      Breath sounds: Normal breath sounds.   Abdominal:      Palpations: Abdomen is soft.      Tenderness: There is no abdominal tenderness.   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      Right lower leg: No edema.      Left lower leg: No edema.   Lymphadenopathy:      Cervical: No cervical adenopathy.   Skin:     General: Skin is warm and dry.      Findings: Rash (trunkal eczema rash) present.   Neurological:      Mental Status: He is alert and oriented to person, place, and time. Mental status is at baseline.   Psychiatric:         Mood and Affect: Mood normal.         Behavior: Behavior normal.          Procedures   INVESTIGATIONS:  - Labs reviewed in Epic     ASSESSMENT AND PLAN:  Problem List Items Addressed This Visit        Nervous and Auditory    Mixed conductive and sensorineural hearing loss, bilateral       Endocrine    Mixed hyperlipidemia    Relevant Medications    rosuvastatin (CRESTOR) 40 MG tablet    Other Relevant Orders    Lipid Panel       Musculoskeletal and Integumentary    Contact dermatitis and eczema    Relevant Medications    triamcinolone (KENALOG) 0.1 % " external ointment    Dupuytren's contracture of left hand       Other    History of non-ST elevation myocardial infarction (NSTEMI) - 12/26/2018 - Tx to [facility]    Relevant Medications    rosuvastatin (CRESTOR) 40 MG tablet    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 - LIMA-LAD, SVG-OM1, radial artery-diagonal - [facility]    Relevant Medications    rosuvastatin (CRESTOR) 40 MG tablet      Other Visit Diagnoses     Annual physical exam    -  Primary    Need for immunization against influenza        Relevant Orders    GH-IMM- FLU VAC PRESRV FREE QUAD SPLIT VIR > 6 MONTHS IM (Completed)    Need for hepatitis C screening test        Relevant Orders    Hepatitis C Screen Reflex to HCV RNA Quant and Genotype    Colon cancer screening        Relevant Orders    ABSTRACT COLOGUARD-NO CHARGE (Completed)    Screening PSA (prostate specific antigen)        Relevant Orders    PSA Screen GH        reviewed diet, exercise and weight control, recommended sodium restriction, cardiovascular risk and specific lipid/LDL goals reviewed, use of Aspirin to prevent MI and TIA's discussed  -- Expected clinical course discussed    -- Medications and their side effects discussed    Patient Instructions     Immunization History   Administered Date(s) Administered     Influenza Vaccine IM > 6 months Valent IIV4 10/29/2019, 12/10/2020     TDAP Vaccine (Boostrix) 09/04/2001, 07/17/2017      -- Get your tetanus shot updated - from one of the local pharmacies, at your convenience. --- in 2027.     -- Get the new shingles shot (2 in series) (Shingrix) - from one of the local pharmacies, at your convenience. -- Check cost/coverage.     Pneumococcal Pneumonia vaccines (PCV 13 and PCV 23)     Pneumococcal Conjugate 13 - Valent Vaccine (One time only after age 65).     Pneumococcal 23 - Valent Vaccine -- Two doses (One before age 65 and One After)    -- repeat every 5 years in certain patient populations.     PCV 13should be given prior to PCV 23 -- THEN  -- In eight weeks or more, PCV 23 can be given.   If the patient has already received PCV 23, they should not receive PCV 13 for one year.      Labs today.     Medications refilled.     + Cologuard stool blood testing ordered -- mail in sample once you get the kit in the mail.   -- if positive, will need to schedule colonoscopy.   -- if negative, no blood noted, recheck in 3 years.        [facility] - Dr. Feliz   Phone: [phone]       Return in approximately 1 year, or sooner as needed for follow-up with Dr. Jerez.  - Annual Follow-up / Physical    Clinic : [phone]  Appointment line: [phone]      Vladislav Jerez MD   Internal Medicine  [facility]     Portions of this note were dictated using speech recognition software. The note has been proofread but errors in the text may have been overlooked. Please contact me if there are any concerns regarding the accuracy of the dictation.

## 2020-12-11 LAB — HCV AB SERPL QL IA: NONREACTIVE

## 2021-01-13 ENCOUNTER — THERAPY VISIT (OUTPATIENT)
Dept: CHIROPRACTIC MEDICINE | Facility: OTHER | Age: 61
End: 2021-01-13
Attending: CHIROPRACTOR
Payer: COMMERCIAL

## 2021-01-13 DIAGNOSIS — M99.02 SEGMENTAL AND SOMATIC DYSFUNCTION OF THORACIC REGION: ICD-10-CM

## 2021-01-13 DIAGNOSIS — M54.6 PAIN IN THORACIC SPINE: Primary | ICD-10-CM

## 2021-01-13 PROCEDURE — 98940 CHIROPRACT MANJ 1-2 REGIONS: CPT | Mod: AT | Performed by: CHIROPRACTOR

## 2021-01-13 PROCEDURE — 99212 OFFICE O/P EST SF 10 MIN: CPT | Mod: 25 | Performed by: CHIROPRACTOR

## 2021-01-13 NOTE — PROGRESS NOTES
Visit #:  1/4-6  New Episode 1/13/2021    Subjective:  Ellis Scott is a 60 year old male who is seen in f/u up for:        Pain in thoracic spine  Segmental and somatic dysfunction of thoracic region.     Since last visit on Visit date not found,  Ellis Scott reports: Last week approximately Wednesday/Thursday patient was helping his brother move a fish house.  Patient states that immediately following this he began experiencing back pain.  Patient states that back pain seems to be higher than he is used to.  Prior episode of care with the patient indicates symptoms include the lumbopelvic spinal region.  When asked to point to where he is experiencing his pain patient points to the TL junction primarily on the right side.    Attempted to manage symptoms with heat which seem to increase back pain.  Ice seems to be decreasing it.  Patient states that transitioning from sitting to standing or laying to standing makes pain worse.  No current upper or lower extremity radiculopathy.  As symptoms are not improving patient presents for further evaluation and treatment.    Area of chief complaint:  Thoracic and Lumbar :  Symptoms are graded at 0-8/10. The quality is described as intermittently tight and painful.       Objective:  The following was observed:  Oswestry (MICAELA) Questionnaire    OSWESTRY DISABILITY INDEX 1/13/2021   Count 9   Sum 13   Oswestry Score (%) 28.89   Some recent data might be hidden    Sitting limited to >10 minutes, standing limited to > 10 minutes.    Thoracic/lumbar AROM: Extension restriction approximately 10-15 degrees with endrange pain, lateral flexion restricted bilaterally by approximately 10 degrees.  Rotation restriction noted bilaterally approximately 5 degrees    Ely's: -right, -left  Kemps: +right, -left    P: palpatory [id number] right side:    A: static palpation demonstrates intersegmental asymmetry , thoracic  R: motion palpation notes restricted motion, T12   T: muscle spasm  at level(s): Quad lumb R>>L    Segmental spinal dysfunction/restrictions found at:  :  T12 Left rotation restricted, Right lateral flexion restricted and Extension restriction.      Assessment: Acute mid back pain most likely from patient lifting a fish house.  Patient appears to be a good candidate for chiropractic care and anticipate a favorable response with course of treatment.  Current plan of care to include 4-6 visits over the next 4 weeks barring acute exacerbation or slow progression of symptoms.  No contraindications present precluding patient from receiving care today.    Diagnoses:      1. Pain in thoracic spine    2. Segmental and somatic dysfunction of thoracic region        Patient's condition:  Patient had restrictions pre-manipulation    Treatment effectiveness:  Post manipulation there is better intersegmental movement and Patient claims to feel looser post manipulation      Procedures:  E/M 33344 Established patient, straight forward complexity    CMT:  49558 Chiropractic manipulative treatment 1-2 regions performed   Thoracic: Diversified, T12, Prone    Modalities:  None performed this visit    Therapeutic procedures:  None    Response to Treatment  Reduction in symptoms as reported by patient    Prognosis: Good    Progress towards Goals: Reduce back pain by 75%.  Patient will be able to go ice fishing without painful limits.  Reduce oswestry score by 20-30%.  Improve spinal AROM.  Patient will be able to sit and/or stand < 1 hr.    Recommendations:    Instructions:ice 20 minutes every other hour as needed    Follow-up:  Return to care in 5 days.

## 2021-01-19 LAB — COLOGUARD-ABSTRACT: NEGATIVE

## 2021-01-21 ENCOUNTER — THERAPY VISIT (OUTPATIENT)
Dept: CHIROPRACTIC MEDICINE | Facility: OTHER | Age: 61
End: 2021-01-21
Attending: CHIROPRACTOR
Payer: COMMERCIAL

## 2021-01-21 DIAGNOSIS — M54.6 PAIN IN THORACIC SPINE: Primary | ICD-10-CM

## 2021-01-21 DIAGNOSIS — M99.02 SEGMENTAL AND SOMATIC DYSFUNCTION OF THORACIC REGION: ICD-10-CM

## 2021-01-21 PROCEDURE — 98940 CHIROPRACT MANJ 1-2 REGIONS: CPT | Mod: AT | Performed by: CHIROPRACTOR

## 2021-01-21 NOTE — PROGRESS NOTES
Visit #:  2/4-6    Subjective:  Ellis Scott is a 60 year old male who is seen in f/u up for:        Pain in thoracic spine  Segmental and somatic dysfunction of thoracic region.     Since last visit on 1/13/2021,  Ellis Scott reports: Symptoms doing quite a bit better after last visit.  Patient states that the following day back pain seem to have returned.  Patient reports that he needed to drive to [place].  During the trip symptoms seem to do pretty well.  Upon returning home back pain seem to have resolved on its own.  Patient has been able to go fishing and do most of his daily activities without much pain.  Still noticing some knotted sensations along the same area and notes that sitting is still not completely comfortable which is why he is presenting at this time.    Area of chief complaint:  Thoracic :  Symptoms are graded at 0-1/10. The quality is described as knotted.       Objective:  The following was observed:    P: palpatory tenderness right side T12:    A: static palpation demonstrates intersegmental asymmetry , thoracic  R: motion palpation notes restricted motion, restricted motion , T12   T: No spasms present, localized taut tender fibers noted TL junction paraspinal musculature right side    Segmental spinal dysfunction/restrictions found at:  :  T12 Right lateral flexion restricted and Extension restriction.      Assessment: Patient showing quite a bit of improvement following last visit.  Patient may still benefit from 1-2 additional follow-up visits within the next couple of weeks.    Patient was informed on today's visit that due to my own upcoming medical leave of absence I will be unable to provide follow-up care after next week.  Patient provided with alternative chiropractor, Dr. Margo Brownlee should care be necessary.    Diagnoses:      1. Pain in thoracic spine    2. Segmental and somatic dysfunction of thoracic region        Patient's condition:  Patient had restrictions  pre-manipulation and Patient is noticing a decrease in their symptoms    Treatment effectiveness:  Post manipulation there is better intersegmental movement and Patient claims to feel looser post manipulation      Procedures:  CMT:  86496 Chiropractic manipulative treatment 1-2 regions performed   Thoracic: Diversified, T12, Prone    Modalities:  None performed this visit    Therapeutic procedures:  None    Response to Treatment  Reduction in symptoms as reported by patient    Prognosis: Excellent    Progress towards Goals: Reduce back pain by 75%. In progress  Patient will be able to go ice fishing without painful limits. In progress  Reduce oswestry score by 20-30%. In progress  Improve spinal AROM.In progress  Patient will be able to sit and/or stand < 1 hr. Goal met 1/21/2021    Recommendations:    Instructions:Monitor symptoms and perform activities as tolerated    Follow-up:  Return to care if symptoms persist.

## 2021-01-28 ENCOUNTER — TRANSFERRED RECORDS (OUTPATIENT)
Dept: HEALTH INFORMATION MANAGEMENT | Facility: OTHER | Age: 61
End: 2021-01-28

## 2021-02-04 ENCOUNTER — TELEPHONE (OUTPATIENT)
Dept: INTERNAL MEDICINE | Facility: OTHER | Age: 61
End: 2021-02-04

## 2021-02-04 NOTE — TELEPHONE ENCOUNTER
Spoke with patient could not find a reason why we called patient. Sheryl Tan LPN .............2/4/2021  3:45 PM

## 2021-02-04 NOTE — TELEPHONE ENCOUNTER
Pt is calling stating he is calling back from a message that was left for him, someone left him a message, not sure if it has to do with Cardiology appt at [facility], he is unclear as to why we are calling him.  Please call, thank you!    Jeni Jefferson on 2/4/2021 at 2:40 PM

## 2021-10-09 ENCOUNTER — HEALTH MAINTENANCE LETTER (OUTPATIENT)
Age: 61
End: 2021-10-09

## 2021-12-05 DIAGNOSIS — I25.2 HISTORY OF NON-ST ELEVATION MYOCARDIAL INFARCTION (NSTEMI): ICD-10-CM

## 2021-12-05 DIAGNOSIS — E78.2 MIXED HYPERLIPIDEMIA: ICD-10-CM

## 2021-12-05 DIAGNOSIS — Z95.1 S/P CABG X 3: ICD-10-CM

## 2021-12-08 RX ORDER — ROSUVASTATIN CALCIUM 40 MG/1
TABLET, COATED ORAL
Qty: 90 TABLET | Refills: 1 | Status: SHIPPED | OUTPATIENT
Start: 2021-12-08 | End: 2023-04-25

## 2021-12-08 NOTE — TELEPHONE ENCOUNTER
Prescription refilled per RN Medication Refill Policy..................Ida Camacho RN 12/8/2021 2:02 PM

## 2022-01-29 ENCOUNTER — HEALTH MAINTENANCE LETTER (OUTPATIENT)
Age: 62
End: 2022-01-29

## 2022-03-03 ENCOUNTER — TRANSFERRED RECORDS (OUTPATIENT)
Dept: HEALTH INFORMATION MANAGEMENT | Facility: OTHER | Age: 62
End: 2022-03-03
Payer: COMMERCIAL

## 2022-09-17 ENCOUNTER — HEALTH MAINTENANCE LETTER (OUTPATIENT)
Age: 62
End: 2022-09-17

## 2023-04-25 ENCOUNTER — OFFICE VISIT (OUTPATIENT)
Dept: INTERNAL MEDICINE | Facility: OTHER | Age: 63
End: 2023-04-25
Attending: INTERNAL MEDICINE
Payer: COMMERCIAL

## 2023-04-25 VITALS
SYSTOLIC BLOOD PRESSURE: 116 MMHG | HEART RATE: 70 BPM | RESPIRATION RATE: 20 BRPM | WEIGHT: 212.8 LBS | HEIGHT: 72 IN | OXYGEN SATURATION: 98 % | TEMPERATURE: 98.1 F | DIASTOLIC BLOOD PRESSURE: 68 MMHG | BODY MASS INDEX: 28.82 KG/M2

## 2023-04-25 DIAGNOSIS — M72.0 DUPUYTREN'S CONTRACTURE OF LEFT HAND: ICD-10-CM

## 2023-04-25 DIAGNOSIS — E55.9 VITAMIN D DEFICIENCY: ICD-10-CM

## 2023-04-25 DIAGNOSIS — Z12.5 ENCOUNTER FOR SCREENING FOR MALIGNANT NEOPLASM OF PROSTATE: ICD-10-CM

## 2023-04-25 DIAGNOSIS — M65.341 TRIGGER FINGER, RIGHT RING FINGER: ICD-10-CM

## 2023-04-25 DIAGNOSIS — Z00.00 ANNUAL PHYSICAL EXAM: ICD-10-CM

## 2023-04-25 DIAGNOSIS — H90.6 MIXED CONDUCTIVE AND SENSORINEURAL HEARING LOSS, BILATERAL: ICD-10-CM

## 2023-04-25 DIAGNOSIS — E53.8 VITAMIN B12 DEFICIENCY: ICD-10-CM

## 2023-04-25 DIAGNOSIS — Z95.1 S/P CABG X 3: ICD-10-CM

## 2023-04-25 DIAGNOSIS — E78.2 MIXED HYPERLIPIDEMIA: ICD-10-CM

## 2023-04-25 DIAGNOSIS — R35.1 NOCTURIA: ICD-10-CM

## 2023-04-25 DIAGNOSIS — I25.10 CORONARY ARTERY DISEASE INVOLVING NATIVE CORONARY ARTERY OF NATIVE HEART WITHOUT ANGINA PECTORIS: Primary | ICD-10-CM

## 2023-04-25 DIAGNOSIS — Z71.85 VACCINE COUNSELING: ICD-10-CM

## 2023-04-25 DIAGNOSIS — I25.2 HISTORY OF NON-ST ELEVATION MYOCARDIAL INFARCTION (NSTEMI): ICD-10-CM

## 2023-04-25 LAB
ALBUMIN SERPL BCG-MCNC: 4 G/DL (ref 3.5–5.2)
ALP SERPL-CCNC: 69 U/L (ref 40–129)
ALT SERPL W P-5'-P-CCNC: 30 U/L (ref 10–50)
ANION GAP SERPL CALCULATED.3IONS-SCNC: 12 MMOL/L (ref 7–15)
AST SERPL W P-5'-P-CCNC: 22 U/L (ref 10–50)
BILIRUB SERPL-MCNC: 0.6 MG/DL
BUN SERPL-MCNC: 14.9 MG/DL (ref 8–23)
CALCIUM SERPL-MCNC: 9 MG/DL (ref 8.8–10.2)
CHLORIDE SERPL-SCNC: 103 MMOL/L (ref 98–107)
CHOLEST SERPL-MCNC: 119 MG/DL
CREAT SERPL-MCNC: 1.2 MG/DL (ref 0.67–1.17)
DEPRECATED HCO3 PLAS-SCNC: 26 MMOL/L (ref 22–29)
ERYTHROCYTE [DISTWIDTH] IN BLOOD BY AUTOMATED COUNT: 12.6 % (ref 10–15)
GFR SERPL CREATININE-BSD FRML MDRD: 68 ML/MIN/1.73M2
GLUCOSE SERPL-MCNC: 94 MG/DL (ref 70–99)
HCT VFR BLD AUTO: 42.4 % (ref 40–53)
HDLC SERPL-MCNC: 42 MG/DL
HGB BLD-MCNC: 14.4 G/DL (ref 13.3–17.7)
LDLC SERPL CALC-MCNC: 57 MG/DL
MCH RBC QN AUTO: 30.3 PG (ref 26.5–33)
MCHC RBC AUTO-ENTMCNC: 34 G/DL (ref 31.5–36.5)
MCV RBC AUTO: 89 FL (ref 78–100)
NONHDLC SERPL-MCNC: 77 MG/DL
PLATELET # BLD AUTO: 231 10E3/UL (ref 150–450)
POTASSIUM SERPL-SCNC: 4.8 MMOL/L (ref 3.4–5.3)
PROT SERPL-MCNC: 6.8 G/DL (ref 6.4–8.3)
PSA SERPL DL<=0.01 NG/ML-MCNC: 0.77 NG/ML (ref 0–4.5)
RBC # BLD AUTO: 4.76 10E6/UL (ref 4.4–5.9)
SODIUM SERPL-SCNC: 141 MMOL/L (ref 136–145)
TRIGL SERPL-MCNC: 98 MG/DL
TSH SERPL DL<=0.005 MIU/L-ACNC: 1.42 UIU/ML (ref 0.3–4.2)
VIT B12 SERPL-MCNC: 545 PG/ML (ref 232–1245)
WBC # BLD AUTO: 6.4 10E3/UL (ref 4–11)

## 2023-04-25 PROCEDURE — 99214 OFFICE O/P EST MOD 30 MIN: CPT | Mod: 25 | Performed by: INTERNAL MEDICINE

## 2023-04-25 PROCEDURE — 82607 VITAMIN B-12: CPT | Mod: ZL | Performed by: INTERNAL MEDICINE

## 2023-04-25 PROCEDURE — 99396 PREV VISIT EST AGE 40-64: CPT | Performed by: INTERNAL MEDICINE

## 2023-04-25 PROCEDURE — 80053 COMPREHEN METABOLIC PANEL: CPT | Mod: ZL | Performed by: INTERNAL MEDICINE

## 2023-04-25 PROCEDURE — 85027 COMPLETE CBC AUTOMATED: CPT | Mod: ZL | Performed by: INTERNAL MEDICINE

## 2023-04-25 PROCEDURE — G0103 PSA SCREENING: HCPCS | Mod: ZL | Performed by: INTERNAL MEDICINE

## 2023-04-25 PROCEDURE — 84153 ASSAY OF PSA TOTAL: CPT | Mod: ZL | Performed by: INTERNAL MEDICINE

## 2023-04-25 PROCEDURE — 80061 LIPID PANEL: CPT | Mod: ZL | Performed by: INTERNAL MEDICINE

## 2023-04-25 PROCEDURE — 36415 COLL VENOUS BLD VENIPUNCTURE: CPT | Mod: ZL | Performed by: INTERNAL MEDICINE

## 2023-04-25 PROCEDURE — 84443 ASSAY THYROID STIM HORMONE: CPT | Mod: ZL | Performed by: INTERNAL MEDICINE

## 2023-04-25 PROCEDURE — 82306 VITAMIN D 25 HYDROXY: CPT | Mod: ZL | Performed by: INTERNAL MEDICINE

## 2023-04-25 RX ORDER — METOPROLOL SUCCINATE 25 MG/1
25 TABLET, EXTENDED RELEASE ORAL DAILY
COMMUNITY
Start: 2023-02-07 | End: 2023-04-25

## 2023-04-25 RX ORDER — ROSUVASTATIN CALCIUM 40 MG/1
40 TABLET, COATED ORAL AT BEDTIME
Qty: 90 TABLET | Refills: 4 | Status: SHIPPED | OUTPATIENT
Start: 2023-04-25 | End: 2024-05-27

## 2023-04-25 RX ORDER — CHOLECALCIFEROL (VITAMIN D3) 50 MCG
2 TABLET ORAL DAILY
Qty: 180 TABLET | Refills: 4 | Status: SHIPPED | OUTPATIENT
Start: 2023-04-25

## 2023-04-25 RX ORDER — METOPROLOL SUCCINATE 25 MG/1
25 TABLET, EXTENDED RELEASE ORAL DAILY
Qty: 90 TABLET | Refills: 4 | Status: SHIPPED | OUTPATIENT
Start: 2023-04-25 | End: 2024-05-27

## 2023-04-25 ASSESSMENT — ENCOUNTER SYMPTOMS
CHILLS: 0
PARESTHESIAS: 0
MYALGIAS: 0
HEARTBURN: 0
DIARRHEA: 0
FEVER: 0
NERVOUS/ANXIOUS: 0
NAUSEA: 0
COUGH: 0
SORE THROAT: 0
PALPITATIONS: 0
ABDOMINAL PAIN: 0
WEAKNESS: 0
FREQUENCY: 0
EYE PAIN: 0
HEADACHES: 0
HEMATURIA: 0
DIZZINESS: 0
ARTHRALGIAS: 0
SHORTNESS OF BREATH: 0
DYSURIA: 0
JOINT SWELLING: 0
CONSTIPATION: 0
HEMATOCHEZIA: 0
BRUISES/BLEEDS EASILY: 0

## 2023-04-25 ASSESSMENT — PAIN SCALES - GENERAL: PAINLEVEL: NO PAIN (0)

## 2023-04-25 NOTE — NURSING NOTE
Chief Complaint   Patient presents with     Physical         Initial /68 (BP Location: Right arm, Patient Position: Sitting, Cuff Size: Adult Regular)   Pulse 70   Temp 98.1  F (36.7  C) (Temporal)   Resp 20   Ht 1.829 m (6')   Wt 96.5 kg (212 lb 12.8 oz)   SpO2 98%   BMI 28.86 kg/m   Estimated body mass index is 28.86 kg/m  as calculated from the following:    Height as of this encounter: 1.829 m (6').    Weight as of this encounter: 96.5 kg (212 lb 12.8 oz).       FOOD SECURITY SCREENING QUESTIONS:    The next two questions are to help us understand your food security.  If you are feeling you need any assistance in this area, we have resources available to support you today.    Hunger Vital Signs:  Within the past 12 months we worried whether our food would run out before we got money to buy more. Never  Within the past 12 months the food we bought just didn't last and we didn't have money to get more. Never    Advance Care Directive on file? yes      Medication reconciliation complete.      Adelfo Lerma,on 4/25/2023 at 9:07 AM

## 2023-04-25 NOTE — PATIENT INSTRUCTIONS
Blood pressure is well controlled.     Medications refilled.   Labs are pending.     Start Vitamin D.   Start B-complex with B12.     Keep stretching your fingers and wearing the trigger finger splint.     Return in approximately 1 year, or sooner as needed for follow-up with Dr. Jerez.  - Annual Follow-up / Physical - Welcome to Medicare Visit     Clinic : [phone]  Appointment line: [phone]

## 2023-04-26 LAB — DEPRECATED CALCIDIOL+CALCIFEROL SERPL-MC: 24 UG/L (ref 20–75)

## 2023-05-31 ENCOUNTER — TRANSFERRED RECORDS (OUTPATIENT)
Dept: HEALTH INFORMATION MANAGEMENT | Facility: OTHER | Age: 63
End: 2023-05-31
Payer: COMMERCIAL

## 2024-05-28 ENCOUNTER — OFFICE VISIT (OUTPATIENT)
Dept: INTERNAL MEDICINE | Facility: OTHER | Age: 64
End: 2024-05-28
Attending: INTERNAL MEDICINE
Payer: MEDICARE

## 2024-05-28 VITALS
HEIGHT: 71 IN | SYSTOLIC BLOOD PRESSURE: 120 MMHG | RESPIRATION RATE: 16 BRPM | OXYGEN SATURATION: 97 % | TEMPERATURE: 98.4 F | BODY MASS INDEX: 30.24 KG/M2 | WEIGHT: 216 LBS | HEART RATE: 53 BPM | DIASTOLIC BLOOD PRESSURE: 74 MMHG

## 2024-05-28 DIAGNOSIS — Z12.5 ENCOUNTER FOR SCREENING FOR MALIGNANT NEOPLASM OF PROSTATE: ICD-10-CM

## 2024-05-28 DIAGNOSIS — I25.2 HISTORY OF NON-ST ELEVATION MYOCARDIAL INFARCTION (NSTEMI): ICD-10-CM

## 2024-05-28 DIAGNOSIS — E66.09 CLASS 1 OBESITY DUE TO EXCESS CALORIES WITH SERIOUS COMORBIDITY AND BODY MASS INDEX (BMI) OF 30.0 TO 30.9 IN ADULT: ICD-10-CM

## 2024-05-28 DIAGNOSIS — Z13.6 SCREENING FOR AAA (ABDOMINAL AORTIC ANEURYSM): ICD-10-CM

## 2024-05-28 DIAGNOSIS — E78.2 MIXED HYPERLIPIDEMIA: ICD-10-CM

## 2024-05-28 DIAGNOSIS — Z23 NEED FOR PNEUMOCOCCAL 20-VALENT CONJUGATE VACCINATION: ICD-10-CM

## 2024-05-28 DIAGNOSIS — Z00.00 WELCOME TO MEDICARE PREVENTIVE VISIT: Primary | ICD-10-CM

## 2024-05-28 DIAGNOSIS — N18.31 STAGE 3A CHRONIC KIDNEY DISEASE (CKD) (H): ICD-10-CM

## 2024-05-28 DIAGNOSIS — E66.811 CLASS 1 OBESITY DUE TO EXCESS CALORIES WITH SERIOUS COMORBIDITY AND BODY MASS INDEX (BMI) OF 30.0 TO 30.9 IN ADULT: ICD-10-CM

## 2024-05-28 DIAGNOSIS — Z95.1 S/P CABG X 3: ICD-10-CM

## 2024-05-28 DIAGNOSIS — H90.6 MIXED CONDUCTIVE AND SENSORINEURAL HEARING LOSS, BILATERAL: ICD-10-CM

## 2024-05-28 DIAGNOSIS — I25.10 CORONARY ARTERY DISEASE INVOLVING NATIVE CORONARY ARTERY OF NATIVE HEART WITHOUT ANGINA PECTORIS: ICD-10-CM

## 2024-05-28 DIAGNOSIS — L25.9 CONTACT DERMATITIS AND ECZEMA: ICD-10-CM

## 2024-05-28 DIAGNOSIS — Z12.11 COLON CANCER SCREENING: ICD-10-CM

## 2024-05-28 DIAGNOSIS — Z71.85 VACCINE COUNSELING: ICD-10-CM

## 2024-05-28 LAB
ALBUMIN SERPL BCG-MCNC: 4.4 G/DL (ref 3.5–5.2)
ALP SERPL-CCNC: 60 U/L (ref 40–150)
ALT SERPL W P-5'-P-CCNC: 19 U/L (ref 0–70)
ANION GAP SERPL CALCULATED.3IONS-SCNC: 9 MMOL/L (ref 7–15)
AST SERPL W P-5'-P-CCNC: 23 U/L (ref 0–45)
BASOPHILS # BLD AUTO: 0 10E3/UL (ref 0–0.2)
BASOPHILS NFR BLD AUTO: 1 %
BILIRUB SERPL-MCNC: 0.4 MG/DL
BUN SERPL-MCNC: 25.8 MG/DL (ref 8–23)
CALCIUM SERPL-MCNC: 9.6 MG/DL (ref 8.8–10.2)
CHLORIDE SERPL-SCNC: 107 MMOL/L (ref 98–107)
CHOLEST SERPL-MCNC: 118 MG/DL
CREAT SERPL-MCNC: 1.56 MG/DL (ref 0.67–1.17)
DEPRECATED HCO3 PLAS-SCNC: 27 MMOL/L (ref 22–29)
EGFRCR SERPLBLD CKD-EPI 2021: 49 ML/MIN/1.73M2
EOSINOPHIL # BLD AUTO: 0.2 10E3/UL (ref 0–0.7)
EOSINOPHIL NFR BLD AUTO: 3 %
ERYTHROCYTE [DISTWIDTH] IN BLOOD BY AUTOMATED COUNT: 13.4 % (ref 10–15)
FASTING STATUS PATIENT QL REPORTED: NO
FASTING STATUS PATIENT QL REPORTED: NO
GLUCOSE SERPL-MCNC: 90 MG/DL (ref 70–99)
HCT VFR BLD AUTO: 41.7 % (ref 40–53)
HDLC SERPL-MCNC: 42 MG/DL
HGB BLD-MCNC: 13.9 G/DL (ref 13.3–17.7)
IMM GRANULOCYTES # BLD: 0 10E3/UL
IMM GRANULOCYTES NFR BLD: 0 %
LDLC SERPL CALC-MCNC: 47 MG/DL
LYMPHOCYTES # BLD AUTO: 1.4 10E3/UL (ref 0.8–5.3)
LYMPHOCYTES NFR BLD AUTO: 23 %
MCH RBC QN AUTO: 30.8 PG (ref 26.5–33)
MCHC RBC AUTO-ENTMCNC: 33.3 G/DL (ref 31.5–36.5)
MCV RBC AUTO: 93 FL (ref 78–100)
MONOCYTES # BLD AUTO: 0.5 10E3/UL (ref 0–1.3)
MONOCYTES NFR BLD AUTO: 8 %
NEUTROPHILS # BLD AUTO: 4.2 10E3/UL (ref 1.6–8.3)
NEUTROPHILS NFR BLD AUTO: 66 %
NONHDLC SERPL-MCNC: 76 MG/DL
NRBC # BLD AUTO: 0 10E3/UL
NRBC BLD AUTO-RTO: 0 /100
PLATELET # BLD AUTO: 207 10E3/UL (ref 150–450)
POTASSIUM SERPL-SCNC: 4.9 MMOL/L (ref 3.4–5.3)
PROT SERPL-MCNC: 6.9 G/DL (ref 6.4–8.3)
PSA SERPL DL<=0.01 NG/ML-MCNC: 0.68 NG/ML (ref 0–4.5)
RBC # BLD AUTO: 4.51 10E6/UL (ref 4.4–5.9)
SODIUM SERPL-SCNC: 143 MMOL/L (ref 135–145)
TRIGL SERPL-MCNC: 143 MG/DL
WBC # BLD AUTO: 6.4 10E3/UL (ref 4–11)

## 2024-05-28 PROCEDURE — G0009 ADMIN PNEUMOCOCCAL VACCINE: HCPCS

## 2024-05-28 PROCEDURE — 36415 COLL VENOUS BLD VENIPUNCTURE: CPT | Mod: ZL | Performed by: INTERNAL MEDICINE

## 2024-05-28 PROCEDURE — G0103 PSA SCREENING: HCPCS | Mod: ZL | Performed by: INTERNAL MEDICINE

## 2024-05-28 PROCEDURE — 80061 LIPID PANEL: CPT | Mod: ZL | Performed by: INTERNAL MEDICINE

## 2024-05-28 PROCEDURE — 82040 ASSAY OF SERUM ALBUMIN: CPT | Mod: ZL | Performed by: INTERNAL MEDICINE

## 2024-05-28 PROCEDURE — G0402 INITIAL PREVENTIVE EXAM: HCPCS | Performed by: INTERNAL MEDICINE

## 2024-05-28 PROCEDURE — 85041 AUTOMATED RBC COUNT: CPT | Mod: ZL | Performed by: INTERNAL MEDICINE

## 2024-05-28 PROCEDURE — G0463 HOSPITAL OUTPT CLINIC VISIT: HCPCS | Mod: 25

## 2024-05-28 PROCEDURE — 99214 OFFICE O/P EST MOD 30 MIN: CPT | Mod: 25 | Performed by: INTERNAL MEDICINE

## 2024-05-28 RX ORDER — NITROGLYCERIN 0.4 MG/1
TABLET SUBLINGUAL
COMMUNITY
Start: 2023-06-16

## 2024-05-28 RX ORDER — ROSUVASTATIN CALCIUM 40 MG/1
40 TABLET, COATED ORAL AT BEDTIME
Qty: 90 TABLET | Refills: 4 | Status: SHIPPED | OUTPATIENT
Start: 2024-05-28

## 2024-05-28 RX ORDER — METOPROLOL SUCCINATE 25 MG/1
25 TABLET, EXTENDED RELEASE ORAL DAILY
Qty: 90 TABLET | Refills: 4 | Status: SHIPPED | OUTPATIENT
Start: 2024-05-28

## 2024-05-28 RX ORDER — TRIAMCINOLONE ACETONIDE 1 MG/G
OINTMENT TOPICAL 4 TIMES DAILY PRN
Qty: 453 G | Refills: 1 | Status: SHIPPED | OUTPATIENT
Start: 2024-05-28

## 2024-05-28 SDOH — HEALTH STABILITY: PHYSICAL HEALTH: ON AVERAGE, HOW MANY MINUTES DO YOU ENGAGE IN EXERCISE AT THIS LEVEL?: 30 MIN

## 2024-05-28 SDOH — HEALTH STABILITY: PHYSICAL HEALTH: ON AVERAGE, HOW MANY DAYS PER WEEK DO YOU ENGAGE IN MODERATE TO STRENUOUS EXERCISE (LIKE A BRISK WALK)?: 7 DAYS

## 2024-05-28 ASSESSMENT — ENCOUNTER SYMPTOMS
NAUSEA: 0
MYALGIAS: 0
EYE PAIN: 0
DIARRHEA: 0
BRUISES/BLEEDS EASILY: 0
HEMATOCHEZIA: 0
COUGH: 0
HEADACHES: 0
PALPITATIONS: 0
SORE THROAT: 0
ABDOMINAL PAIN: 0
DYSURIA: 0
CONSTIPATION: 0
WEAKNESS: 0
HEMATURIA: 0
FREQUENCY: 0
ARTHRALGIAS: 0
JOINT SWELLING: 0
NERVOUS/ANXIOUS: 0
CHILLS: 0
PARESTHESIAS: 0
HEARTBURN: 0
SHORTNESS OF BREATH: 0
FEVER: 0
DIZZINESS: 0

## 2024-05-28 ASSESSMENT — SOCIAL DETERMINANTS OF HEALTH (SDOH): HOW OFTEN DO YOU GET TOGETHER WITH FRIENDS OR RELATIVES?: MORE THAN THREE TIMES A WEEK

## 2024-05-28 ASSESSMENT — PAIN SCALES - GENERAL: PAINLEVEL: NO PAIN (0)

## 2024-05-28 NOTE — PROGRESS NOTES
Assessment & Plan     ICD-10-CM    1. Welcome to Medicare preventive visit  Z00.00       2. Vaccine counseling  Z71.85       3. Coronary artery disease involving native coronary artery of native heart without angina pectoris  I25.10 metoprolol succinate ER (TOPROL XL) 25 MG 24 hr tablet     rosuvastatin (CRESTOR) 40 MG tablet     CBC with Platelets & Differential     Comprehensive metabolic panel     CBC with Platelets & Differential     Comprehensive metabolic panel      4. History of non-ST elevation myocardial infarction (NSTEMI) - 12/26/2018 - Tx to [facility]  I25.2 metoprolol succinate ER (TOPROL XL) 25 MG 24 hr tablet     rosuvastatin (CRESTOR) 40 MG tablet      5. 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 Mixed hyperlipidemia  E78.2 rosuvastatin (CRESTOR) 40 MG tablet     Lipid Profile     Lipid Profile      7. Mixed conductive and sensorineural hearing loss, bilateral  H90.6       8. Contact dermatitis and eczema  L25.9 triamcinolone (KENALOG) 0.1 % external ointment      9. Class 1 obesity due to excess calories with serious comorbidity and body mass index (BMI) of 30.0 to 30.9 in adult  E66.09     Z68.30       10. Need for pneumococcal 20-valent conjugate vaccination  Z23 PNEUMOCOCCAL 20 VALENT CONJUGATE (PREVNAR 20)      11. Screening for AAA (abdominal aortic aneurysm)  Z13.6 US Aorta Medicare AAA Screening      12. Colon cancer screening  Z12.11 COLOGGERRY(EXACT SCIENCES)      13. Encounter for screening for malignant neoplasm of prostate  Z12.5 Prostate Specific Antigen Screen     Prostate Specific Antigen Screen      14. Stage 3a chronic kidney disease (CKD) (H)  N18.31          Patient presents for welcome to Medicare preventative visit.    Vaccine counseling completed.  Orders placed.    Coronary disease, denies exertional angina.  Non-ST  elevation back in December 2018.  Ended up with three-vessel bypass surgery with left radial artery used as part of the graft material.  [facility] in [place].  Continues with Crestor, metoprolol.  Needs refills.  Check labs.    Chronic bilateral hearing loss, no recent changes.    Contact dermatitis/eczema.  Still using triamcinolone ointment as needed.  Needs refills.    Pneumococcal 20 vaccine today.    Abdominal aortic aneurysm ultrasound screening ordered.    Colon cancer screening is due, Cologuard ordered.    Prostate lab cancer screen, check TSH levels.    MIXED HYPERLIPIDEMIA.  Ongoing. LDL is at goal: Yes. Triglycerides are at goal: Yes.    Medication side effects reported: None.   Continue current medications for now. Medication list reviewed/updated. Refills completed as needed.     OBESITY - Ongoing.  (See Encounter Diagnosis list above for obesity class / severity).  - Encourage continued maintenance / improvement in diet and exercise.   - Consider Nutrition / Dietician appointment.  - Weight loss would improve Hypertension, Cholesterol.      Chronic Kidney Disease, Stage 3a (GFR 45-59), chronic, ongoing.  Kidney function had been slowly declining.  Encourage NSAID avoidance.       Vaccine counseling completed.  Encourage routine / annual vaccinations.     Results for orders placed or performed in visit on 05/28/24   Comprehensive metabolic panel     Status: Abnormal   Result Value Ref Range    Sodium 143 135 - 145 mmol/L    Potassium 4.9 3.4 - 5.3 mmol/L    Carbon Dioxide (CO2) 27 22 - 29 mmol/L    Anion Gap 9 7 - 15 mmol/L    Urea Nitrogen 25.8 (H) 8.0 - 23.0 mg/dL    Creatinine 1.56 (H) 0.67 - 1.17 mg/dL    GFR Estimate 49 (L) >60 mL/min/1.73m2    Calcium 9.6 8.8 - 10.2 mg/dL    Chloride 107 98 - 107 mmol/L    Glucose 90 70 - 99 mg/dL    Alkaline Phosphatase 60 40 - 150 U/L    AST 23 0 - 45 U/L    ALT 19 0 - 70 U/L    Protein Total 6.9 6.4 - 8.3 g/dL    Albumin 4.4 3.5 - 5.2 g/dL     Bilirubin Total 0.4 <=1.2 mg/dL    Patient Fasting > 8hrs? No    Lipid Profile     Status: None   Result Value Ref Range    Cholesterol 118 <200 mg/dL    Triglycerides 143 <150 mg/dL    Direct Measure HDL 42 >=40 mg/dL    LDL Cholesterol Calculated 47 <=100 mg/dL    Non HDL Cholesterol 76 <130 mg/dL    Patient Fasting > 8hrs? No     Narrative    Cholesterol  Desirable:  <200 mg/dL    Triglycerides  Normal:  Less than 150 mg/dL  Borderline High:  150-199 mg/dL  High:  200-499 mg/dL  Very High:  Greater than or equal to 500 mg/dL    Direct Measure HDL  Female:  Greater than or equal to 50 mg/dL   Male:  Greater than or equal to 40 mg/dL    LDL Cholesterol  Desirable:  <100mg/dL  Above Desirable:  100-129 mg/dL   Borderline High:  130-159 mg/dL   High:  160-189 mg/dL   Very High:  >= 190 mg/dL    Non HDL Cholesterol  Desirable:  130 mg/dL  Above Desirable:  130-159 mg/dL  Borderline High:  160-189 mg/dL  High:  190-219 mg/dL  Very High:  Greater than or equal to 220 mg/dL   Prostate Specific Antigen Screen     Status: Normal   Result Value Ref Range    Prostate Specific Antigen Screen 0.68 0.00 - 4.50 ng/mL    Narrative    This result is obtained using the Roche Elecsys total PSA method on the don e601 immunoassay analyzer. Results obtained with different assay methods or kits cannot be used interchangeably.   CBC with platelets and differential     Status: None   Result Value Ref Range    WBC Count 6.4 4.0 - 11.0 10e3/uL    RBC Count 4.51 4.40 - 5.90 10e6/uL    Hemoglobin 13.9 13.3 - 17.7 g/dL    Hematocrit 41.7 40.0 - 53.0 %    MCV 93 78 - 100 fL    MCH 30.8 26.5 - 33.0 pg    MCHC 33.3 31.5 - 36.5 g/dL    RDW 13.4 10.0 - 15.0 %    Platelet Count 207 150 - 450 10e3/uL    % Neutrophils 66 %    % Lymphocytes 23 %    % Monocytes 8 %    % Eosinophils 3 %    % Basophils 1 %    % Immature Granulocytes 0 %    NRBCs per 100 WBC 0 <1 /100    Absolute Neutrophils 4.2 1.6 - 8.3 10e3/uL    Absolute Lymphocytes 1.4 0.8 - 5.3  "10e3/uL    Absolute Monocytes 0.5 0.0 - 1.3 10e3/uL    Absolute Eosinophils 0.2 0.0 - 0.7 10e3/uL    Absolute Basophils 0.0 0.0 - 0.2 10e3/uL    Absolute Immature Granulocytes 0.0 <=0.4 10e3/uL    Absolute NRBCs 0.0 10e3/uL   CBC with Platelets & Differential     Status: None    Narrative    The following orders were created for panel order CBC with Platelets & Differential.  Procedure                               Abnormality         Status                     ---------                               -----------         ------                     CBC with platelets and d...[[id number]]                      Final result                 Please view results for these tests on the individual orders.      CBC is normal. PSA is normal. Cholesterol levels are at goal. Creatinine is at baseline. GFR 49. LFTs are normal.            BMI  Estimated body mass index is 30.13 kg/m  as calculated from the following:    Height as of this encounter: 1.803 m (5' 11\").    Weight as of this encounter: 98 kg (216 lb).       Counseling  Appropriate preventive services were discussed with this patient, including applicable screening as appropriate for fall prevention, nutrition, physical activity, Tobacco-use cessation, weight loss and cognition.  Checklist reviewing preventive services available has been given to the patient.  Reviewed patient's diet, addressing concerns and/or questions.   The patient was instructed to see the dentist every 6 months.         Return in about 1 year (around 5/28/2025) for Medicare Annual Wellness Visit -- Initial Visit.      Vladislav Jerez MD  [facility]    Review of Systems   Constitutional:  Negative for chills and fever.   HENT:  Negative for congestion, ear pain, hearing loss and sore throat.    Eyes:  Negative for pain and visual disturbance.   Respiratory:  Negative for cough and shortness of breath.    Cardiovascular:  Negative for chest pain, palpitations and peripheral edema. "   Gastrointestinal:  Negative for abdominal pain, constipation, diarrhea, heartburn, hematochezia and nausea.   Genitourinary:  Negative for dysuria, frequency, genital sores, hematuria, impotence, penile discharge and urgency.        Nocturia 1-2x nightly   Musculoskeletal:  Negative for arthralgias, joint swelling and myalgias.        Contractures of left hand - 4th and 5th fingers   Skin:  Negative for rash.        trunkal eczema rash if showers too much, worse in winter   Allergic/Immunologic: Negative for immunocompromised state.   Neurological:  Negative for dizziness, weakness, headaches and paresthesias.   Hematological:  Does not bruise/bleed easily.   Psychiatric/Behavioral:  Negative for mood changes. The patient is not nervous/anxious.          Preventive Care Visit  [facility]  Vladislav Jerez MD, Internal Medicine  May 28, 2024        Marah Corral is a 63 year old, presenting for the following:  Medicare Visit (Welcome to medicare)        5/28/2024     2:58 PM   Additional Questions   Roomed by Naheed De La Paz LPN        Health Care Directive  Patient does not have a Health Care Directive or Living Will: Discussed advance care planning with patient; however, patient declined at this time.    HPI              5/28/2024   General Health   How would you rate your overall physical health? Good   Feel stress (tense, anxious, or unable to sleep) Not at all         5/28/2024   Nutrition   Diet: Regular (no restrictions)         5/28/2024   Exercise   Days per week of moderate/strenous exercise 7 days   Average minutes spent exercising at this level 30 min         5/28/2024   Social Factors   Frequency of gathering with friends or relatives More than three times a week   Worry food won't last until get money to buy more No   Food not last or not have enough money for food? No   Do you have housing?  Yes   Are you worried about losing your housing? No   Lack of transportation? No    Unable to get utilities (heat,electricity)? No         2024   Fall Risk   Fallen 2 or more times in the past year? No    No   Trouble with walking or balance? No    No          2024   Activities of Daily Living- Home Safety   Needs help with the following daily activites None of the above   Safety concerns in the home None of the above         2024   Dental   Dentist two times every year? (!) NO         2024   Hearing Screening   Hearing concerns? None of the above         2024   Driving Risk Screening   Patient/family members have concerns about driving No         2024   General Alertness/Fatigue Screening   Have you been more tired than usual lately? No         2024   Urinary Incontinence Screening   Bothered by leaking urine in past 6 months No         2024   TB Screening   Were you born outside of the [place]? No         Today's PHQ-2 Score:       2024     2:50 PM   PHQ-2 (  [employer])   Q1: Little interest or pleasure in doing things 0   Q2: Feeling down, depressed or hopeless 0   PHQ-2 Score 0   Q1: Little interest or pleasure in doing things Not at all   Q2: Feeling down, depressed or hopeless Not at all   PHQ-2 Score 0           2024   Substance Use   Alcohol more than 3/day or more than 7/wk No   Do you have a current opioid prescription? No   How severe/bad is pain from 1 to 10? 1/10   Do you use any other substances recreationally? No     Social History     Tobacco Use    Smoking status: Former     Current packs/day: 0.00     Types: Cigarettes, Cigars     Quit date: 2014     Years since quittin.7     Passive exposure: Past    Smokeless tobacco: Never    Tobacco comments:     Quit smoking: Only smokes during deer season and has 1-2 cigars per year   Vaping Use    Vaping status: Never Used   Substance Use Topics    Alcohol use: Yes     Alcohol/week: 2.0 standard drinks of alcohol     Types: 2 Cans of beer per week     Comment: maybe 5 a week at the  very most    Drug use: Yes     Types: Marijuana     Comment: gummies           5/28/2024   AAA Screening   Family history of Abdominal Aortic Aneurysm (AAA)? No         5/28/2024   One time HIV Screening   Previous HIV test? I don't know   Last PSA:   Prostate Specific Antigen Screen   Date Value Ref Range Status   05/28/2024 0.68 0.00 - 4.50 ng/mL Final     ASCVD Risk   The ASCVD Risk score (Deepak NAQVI, et al., 2019) failed to calculate for the following reasons:    The patient has a prior MI or stroke diagnosis            Reviewed and updated as needed this visit by Provider   Tobacco  Allergies  Meds  Problems  Med Hx  Surg Hx  Fam Hx     Sexual Activity            Current providers sharing in care for this patient include:  Patient Care Team:  Vladislav Jerez MD as PCP - General (Internal Medicine)  Vladislav Jerez MD as Assigned PCP    The following health maintenance items are reviewed in Epic and correct as of today:  Health Maintenance   Topic Date Due    RSV VACCINE (Pregnancy & 60+) (1 - 1-dose 60+ series) Never done    AORTIC ANEURYSM SCREENING (SYSTEM ASSIGNED)  Never done    COVID-19 Vaccine (5 - 2023-24 season) 09/01/2023    COLORECTAL CANCER SCREENING  01/19/2024    INFLUENZA VACCINE (Season Ended) 09/01/2024    MEDICARE ANNUAL WELLNESS VISIT  05/28/2025    LIPID  05/28/2025    FALL RISK ASSESSMENT  05/28/2025    GLUCOSE  05/28/2027    DTAP/TDAP/TD IMMUNIZATION (3 - Td or Tdap) 07/17/2027    ADVANCE CARE PLANNING  05/28/2029    HEPATITIS C SCREENING  Completed    PHQ-2 (once per calendar year)  Completed    Pneumococcal Vaccine: 65+ Years  Completed    ZOSTER IMMUNIZATION  Completed    IPV IMMUNIZATION  Aged Out    HPV IMMUNIZATION  Aged Out    MENINGITIS IMMUNIZATION  Aged Out    RSV MONOCLONAL ANTIBODY  Aged Out    HIV SCREENING  Discontinued    LUNG CANCER SCREENING  Discontinued            Objective    Exam  /74   Pulse 53   Temp 98.4  F (36.9  C) (Temporal)   Resp 16    "Ht 1.803 m (5' 11\")   Wt 98 kg (216 lb)   SpO2 97%   BMI 30.13 kg/m     Estimated body mass index is 30.13 kg/m  as calculated from the following:    Height as of this encounter: 1.803 m (5' 11\").    Weight as of this encounter: 98 kg (216 lb).    Physical Exam  Constitutional:       General: He is not in acute distress.     Appearance: He is well-developed. He is not diaphoretic.   HENT:      Head: Normocephalic and atraumatic.   Eyes:      General: No scleral icterus.     Conjunctiva/sclera: Conjunctivae normal.   Neck:      Vascular: No carotid bruit.   Cardiovascular:      Rate and Rhythm: Normal rate and regular rhythm.      Pulses: Normal pulses.      Heart sounds: Murmur: other than left radial pulse is diminished, due to heart surgery.      Comments: Varicose veins  Pulmonary:      Effort: Pulmonary effort is normal.      Breath sounds: Normal breath sounds.   Abdominal:      Palpations: Abdomen is soft.      Tenderness: There is no abdominal tenderness.   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      Cervical back: Neck supple.      Right lower leg: No edema.      Left lower leg: No edema.   Lymphadenopathy:      Cervical: No cervical adenopathy.   Skin:     General: Skin is warm and dry.      Findings: Rash (trunkal eczema rash if showers too much ) present.   Neurological:      Mental Status: He is alert and oriented to person, place, and time. Mental status is at baseline.   Psychiatric:         Mood and Affect: Mood normal.         Behavior: Behavior normal.               5/28/2024   Mini Cog   Clock Draw Score 2 Normal   3 Item Recall 3 objects recalled   Mini Cog Total Score 5         Vision Screen  Patient wears corrective lenses (select all that apply): Worn during vision screen  Vision Screen Results: Pass      Signed Electronically by: Vladislav Jerez MD    "

## 2024-05-28 NOTE — NURSING NOTE
"Chief Complaint   Patient presents with    Medicare Visit     Welcome to medicare       Initial /74   Pulse 53   Temp 98.4  F (36.9  C) (Temporal)   Resp 16   Ht 1.803 m (5' 11\")   Wt 98 kg (216 lb)   SpO2 97%   BMI 30.13 kg/m   Estimated body mass index is 30.13 kg/m  as calculated from the following:    Height as of this encounter: 1.803 m (5' 11\").    Weight as of this encounter: 98 kg (216 lb).  Medication Review: complete    The next two questions are to help us understand your food security.  If you are feeling you need any assistance in this area, we have resources available to support you today.          5/28/2024   SDOH- Food Insecurity   Within the past 12 months, did you worry that your food would run out before you got money to buy more? N   Within the past 12 months, did the food you bought just not last and you didn t have money to get more? N         Health Care Directive:  Patient does not have a Health Care Directive or Living Will: Discussed advance care planning with patient; however, patient declined at this time.    Naheed De La Paz LPN      "

## 2024-06-04 ENCOUNTER — ORDERS ONLY (AUTO-RELEASED) (OUTPATIENT)
Dept: INTERNAL MEDICINE | Facility: OTHER | Age: 64
End: 2024-06-04
Payer: COMMERCIAL

## 2024-06-04 DIAGNOSIS — Z12.11 COLON CANCER SCREENING: ICD-10-CM

## 2024-06-21 LAB — NONINV COLON CA DNA+OCC BLD SCRN STL QL: NEGATIVE

## 2024-06-28 ENCOUNTER — HOSPITAL ENCOUNTER (OUTPATIENT)
Dept: ULTRASOUND IMAGING | Facility: OTHER | Age: 64
Discharge: HOME | End: 2024-06-28
Attending: INTERNAL MEDICINE | Admitting: INTERNAL MEDICINE
Payer: MEDICARE

## 2024-06-28 DIAGNOSIS — Z13.6 SCREENING FOR AAA (ABDOMINAL AORTIC ANEURYSM): ICD-10-CM

## 2024-06-28 PROCEDURE — 76706 US ABDL AORTA SCREEN AAA: CPT

## 2024-07-15 NOTE — ED AVS SNAPSHOT
Please deny rx sent 01/31/2024    Angie Jones MA       [facility]  [address]  [place] [address]  Phone:  [phone]  Fax:  [phone]                                    Ellis Scott   MRN: [id number]    Department:  [facility]   Date of Visit:  7/3/2019           After Visit Summary Signature Page    I have received my discharge instructions, and my questions have been answered. I have discussed any challenges I see with this plan with the nurse or doctor.    ..........................................................................................................................................  Patient/Patient Representative Signature      ..........................................................................................................................................  Patient Representative Print Name and Relationship to Patient    ..................................................               ................................................  Date                                   Time    ..........................................................................................................................................  Reviewed by Signature/Title    ...................................................              ..............................................  Date                                               Time          22EPIC Rev 08/18

## 2024-10-21 ENCOUNTER — NURSE TRIAGE (OUTPATIENT)
Dept: INTERNAL MEDICINE | Facility: OTHER | Age: 64
End: 2024-10-21
Payer: COMMERCIAL

## 2024-10-21 ENCOUNTER — MYC MEDICAL ADVICE (OUTPATIENT)
Dept: INTERNAL MEDICINE | Facility: OTHER | Age: 64
End: 2024-10-21
Payer: COMMERCIAL

## 2024-10-21 NOTE — TELEPHONE ENCOUNTER
Patient transferred to Scheduling and advised to call 911 or present to ER if symptoms suddenly worsen or new symptoms develop. Sindi Hood RN on 10/21/2024 at 4:38 PM      Reason for Disposition   Patient wants to be seen    Additional Information   Negative: Passed out (i.e., lost consciousness, collapsed and was not responding)   Negative: Shock suspected (e.g., cold/pale/clammy skin, too weak to stand, low BP, rapid pulse)   Negative: Difficult to awaken or acting confused (e.g., disoriented, slurred speech)   Negative: Visible sweat on face or sweat dripping down face   Negative: Unable to walk, or can only walk with assistance (e.g., requires support)   Negative: Received SHOCK from implantable cardiac defibrillator and has persisting symptoms (i.e., palpitations, lightheadedness)   Negative: Dizziness, lightheadedness, or weakness and heart beating very rapidly (e.g., > 140 / minute)   Negative: Dizziness, lightheadedness, or weakness and heart beating very slowly (e.g., < 50 / minute)   Negative: Sounds like a life-threatening emergency to the triager   Negative: Chest pain   Negative: Difficulty breathing   Negative: Dizziness, lightheadedness, or weakness   Negative: Heart beating very rapidly (e.g., > 140 / minute) and present now  (Exception: During exercise.)   Negative: Heart beating very slowly (e.g., < 50 / minute)  (Exception: Athlete and heart rate normal for caller.)   Negative: New or worsened shortness of breath with activity (dyspnea on exertion)   Negative: Patient sounds very sick or weak to the triager   Negative: Wearing a 'Holter monitor' or 'cardiac event monitor'   Negative: Received SHOCK from implantable cardiac defibrillator (and now feels well)   Negative: Heart beating very rapidly (e.g., > 140 / minute) and not present now  (Exception: During exercise.)   Negative: Skipped or extra beat(s) and increases with exercise or exertion   Negative: Skipped or extra beat(s) and occurs 4  "or more times per minute   Negative: History of heart disease (i.e., heart attack, bypass surgery, angina, angioplasty)  (Exception: Brief heartbeat symptoms that went away and now feels well.)   Negative: Age > 60 years  (Exception: Brief heartbeat symptoms that went away and now feels well.)   Negative: Taking water pill (i.e., diuretic) or heart medication (e.g., digoxin)    Answer Assessment - Initial Assessment Questions  1. DESCRIPTION: \"Please describe your heart rate or heartbeat that you are having\" (e.g., fast/slow, regular/irregular, skipped or extra beats, \"palpitations\")      Up high in the chest/below the throat, feels like his heart flops    2. ONSET: \"When did it start?\" (Minutes, hours or days)       A couple of days ago     3. DURATION: \"How long does it last\" (e.g., seconds, minutes, hours)      He gets an anxious feeling that's been there all day today, other days it's transient     4. PATTERN \"Does it come and go, or has it been constant since it started?\"  \"Does it get worse with exertion?\"   \"Are you feeling it now?\"      Constant today, but previously transient     5. TAP: \"Using your hand, can you tap out what you are feeling on a chair or table in front of you, so that I can hear?\" (Note: not all patients can do this)        Normal heart rate     6. HEART RATE: \"Can you tell me your heart rate?\" \"How many beats in 15 seconds?\"  (Note: not all patients can do this)        WNL per patient     7. RECURRENT SYMPTOM: \"Have you ever had this before?\" If Yes, ask: \"When was the last time?\" and \"What happened that time?\"       Yes - felt like a bobble head     8. CAUSE: \"What do you think is causing the palpitations?\"      Suspects afib     9. CARDIAC HISTORY: \"Do you have any history of heart disease?\" (e.g., heart attack, angina, bypass surgery, angioplasty, arrhythmia)       As above     10. OTHER SYMPTOMS: \"Do you have any other symptoms?\" (e.g., dizziness, chest pain, sweating, difficulty " "breathing)        Denies     11. PREGNANCY: \"Is there any chance you are pregnant?\" \"When was your last menstrual period?\"        No    Protocols used: Heart Rate and Heartbeat [id number]    "

## 2024-10-21 NOTE — TELEPHONE ENCOUNTER
I have been having some feelings of possible afib that are worrying me.  My pulse and blood pressure seem okay but I am wondering if there is any way to get in to see you about this?  Ellis Scott      Navi Tan RN on 10/21/2024 at 4:28 PM

## 2024-10-21 NOTE — TELEPHONE ENCOUNTER
Turned [place] message into traige encounter.      Routed to Unit 1 Care Team RN for triage pool.    Updated patient on [place].      Navi Tan RN on 10/21/2024 at 4:28 PM

## 2024-10-22 ENCOUNTER — OFFICE VISIT (OUTPATIENT)
Dept: PEDIATRICS | Facility: OTHER | Age: 64
End: 2024-10-22
Attending: INTERNAL MEDICINE
Payer: MEDICARE

## 2024-10-22 VITALS
RESPIRATION RATE: 16 BRPM | HEART RATE: 85 BPM | WEIGHT: 217.9 LBS | SYSTOLIC BLOOD PRESSURE: 109 MMHG | OXYGEN SATURATION: 97 % | BODY MASS INDEX: 30.51 KG/M2 | DIASTOLIC BLOOD PRESSURE: 63 MMHG | HEIGHT: 71 IN | TEMPERATURE: 98.9 F

## 2024-10-22 DIAGNOSIS — Z95.1 S/P CABG X 3: ICD-10-CM

## 2024-10-22 DIAGNOSIS — I25.10 ASCVD (ARTERIOSCLEROTIC CARDIOVASCULAR DISEASE): ICD-10-CM

## 2024-10-22 DIAGNOSIS — I95.1 ORTHOSTASIS: ICD-10-CM

## 2024-10-22 DIAGNOSIS — Z29.11 NEED FOR VACCINATION AGAINST RESPIRATORY SYNCYTIAL VIRUS: ICD-10-CM

## 2024-10-22 DIAGNOSIS — R27.9 UNSPECIFIED LACK OF COORDINATION: ICD-10-CM

## 2024-10-22 DIAGNOSIS — I25.2 HISTORY OF NON-ST ELEVATION MYOCARDIAL INFARCTION (NSTEMI): ICD-10-CM

## 2024-10-22 DIAGNOSIS — N18.31 STAGE 3A CHRONIC KIDNEY DISEASE (CKD) (H): ICD-10-CM

## 2024-10-22 DIAGNOSIS — R42 DIZZINESS: Primary | ICD-10-CM

## 2024-10-22 DIAGNOSIS — Z86.79 HISTORY OF ATRIAL FIBRILLATION: ICD-10-CM

## 2024-10-22 DIAGNOSIS — I25.10 CORONARY ARTERY DISEASE INVOLVING NATIVE CORONARY ARTERY OF NATIVE HEART WITHOUT ANGINA PECTORIS: ICD-10-CM

## 2024-10-22 LAB
ALBUMIN SERPL BCG-MCNC: 4.4 G/DL (ref 3.5–5.2)
ALP SERPL-CCNC: 64 U/L (ref 40–150)
ALT SERPL W P-5'-P-CCNC: 23 U/L (ref 0–70)
ANION GAP SERPL CALCULATED.3IONS-SCNC: 10 MMOL/L (ref 7–15)
AST SERPL W P-5'-P-CCNC: 28 U/L (ref 0–45)
ATRIAL RATE - MUSE: 71 BPM
BILIRUB SERPL-MCNC: 0.5 MG/DL
BUN SERPL-MCNC: 26.7 MG/DL (ref 8–23)
CALCIUM SERPL-MCNC: 9.5 MG/DL (ref 8.8–10.4)
CHLORIDE SERPL-SCNC: 106 MMOL/L (ref 98–107)
CREAT SERPL-MCNC: 1.57 MG/DL (ref 0.67–1.17)
CREAT UR-MCNC: 113.5 MG/DL
DIASTOLIC BLOOD PRESSURE - MUSE: NORMAL MMHG
EGFRCR SERPLBLD CKD-EPI 2021: 48 ML/MIN/1.73M2
ERYTHROCYTE [DISTWIDTH] IN BLOOD BY AUTOMATED COUNT: 13.5 % (ref 10–15)
GLUCOSE SERPL-MCNC: 109 MG/DL (ref 70–99)
HCO3 SERPL-SCNC: 26 MMOL/L (ref 22–29)
HCT VFR BLD AUTO: 41.6 % (ref 40–53)
HGB BLD-MCNC: 13.9 G/DL (ref 13.3–17.7)
INTERPRETATION ECG - MUSE: NORMAL
MAGNESIUM SERPL-MCNC: 2.1 MG/DL (ref 1.7–2.3)
MCH RBC QN AUTO: 30.3 PG (ref 26.5–33)
MCHC RBC AUTO-ENTMCNC: 33.4 G/DL (ref 31.5–36.5)
MCV RBC AUTO: 91 FL (ref 78–100)
MICROALBUMIN UR-MCNC: <12 MG/L
MICROALBUMIN/CREAT UR: NORMAL MG/G{CREAT}
P AXIS - MUSE: 64 DEGREES
PLATELET # BLD AUTO: 202 10E3/UL (ref 150–450)
POTASSIUM SERPL-SCNC: 4.7 MMOL/L (ref 3.4–5.3)
PR INTERVAL - MUSE: 186 MS
PROT SERPL-MCNC: 7.3 G/DL (ref 6.4–8.3)
QRS DURATION - MUSE: 102 MS
QT - MUSE: 426 MS
QTC - MUSE: 462 MS
R AXIS - MUSE: -1 DEGREES
RBC # BLD AUTO: 4.58 10E6/UL (ref 4.4–5.9)
SODIUM SERPL-SCNC: 142 MMOL/L (ref 135–145)
SYSTOLIC BLOOD PRESSURE - MUSE: NORMAL MMHG
T AXIS - MUSE: 61 DEGREES
TSH SERPL DL<=0.005 MIU/L-ACNC: 0.88 UIU/ML (ref 0.3–4.2)
VENTRICULAR RATE- MUSE: 71 BPM
VIT B12 SERPL-MCNC: 448 PG/ML (ref 232–1245)
WBC # BLD AUTO: 6.2 10E3/UL (ref 4–11)

## 2024-10-22 PROCEDURE — G0463 HOSPITAL OUTPT CLINIC VISIT: HCPCS | Mod: 25

## 2024-10-22 PROCEDURE — 90480 ADMN SARSCOV2 VAC 1/ONLY CMP: CPT

## 2024-10-22 PROCEDURE — 93010 ELECTROCARDIOGRAM REPORT: CPT | Performed by: INTERNAL MEDICINE

## 2024-10-22 PROCEDURE — 83735 ASSAY OF MAGNESIUM: CPT | Mod: ZL | Performed by: INTERNAL MEDICINE

## 2024-10-22 PROCEDURE — 80053 COMPREHEN METABOLIC PANEL: CPT | Mod: ZL | Performed by: INTERNAL MEDICINE

## 2024-10-22 PROCEDURE — 36415 COLL VENOUS BLD VENIPUNCTURE: CPT | Mod: ZL | Performed by: INTERNAL MEDICINE

## 2024-10-22 PROCEDURE — 84443 ASSAY THYROID STIM HORMONE: CPT | Mod: ZL | Performed by: INTERNAL MEDICINE

## 2024-10-22 PROCEDURE — 91320 SARSCV2 VAC 30MCG TRS-SUC IM: CPT

## 2024-10-22 PROCEDURE — 85027 COMPLETE CBC AUTOMATED: CPT | Mod: ZL | Performed by: INTERNAL MEDICINE

## 2024-10-22 PROCEDURE — 99214 OFFICE O/P EST MOD 30 MIN: CPT | Performed by: INTERNAL MEDICINE

## 2024-10-22 PROCEDURE — 82607 VITAMIN B-12: CPT | Mod: ZL | Performed by: INTERNAL MEDICINE

## 2024-10-22 PROCEDURE — 82043 UR ALBUMIN QUANTITATIVE: CPT | Mod: ZL | Performed by: INTERNAL MEDICINE

## 2024-10-22 PROCEDURE — 93005 ELECTROCARDIOGRAM TRACING: CPT | Performed by: INTERNAL MEDICINE

## 2024-10-22 PROCEDURE — G0008 ADMIN INFLUENZA VIRUS VAC: HCPCS

## 2024-10-22 PROCEDURE — G0463 HOSPITAL OUTPT CLINIC VISIT: HCPCS | Mod: 25,27

## 2024-10-22 ASSESSMENT — PAIN SCALES - GENERAL: PAINLEVEL: NO PAIN (0)

## 2024-10-22 NOTE — NURSING NOTE
"Chief Complaint   Patient presents with    Irregular Heart Beat     X 3 days       Initial /60   Pulse 68   Temp 98.9  F (37.2  C) (Tympanic)   Resp 16   Ht 1.803 m (5' 11\")   Wt 98.8 kg (217 lb 14.4 oz)   SpO2 97%   BMI 30.39 kg/m   Estimated body mass index is 30.39 kg/m  as calculated from the following:    Height as of this encounter: 1.803 m (5' 11\").    Weight as of this encounter: 98.8 kg (217 lb 14.4 oz).  Medication Review: complete    The next two questions are to help us understand your food security.  If you are feeling you need any assistance in this area, we have resources available to support you today.          5/28/2024   SDOH- Food Insecurity   Within the past 12 months, did you worry that your food would run out before you got money to buy more? N   Within the past 12 months, did the food you bought just not last and you didn t have money to get more? N            Health Care Directive:  Patient does not have a Health Care Directive or Living Will: Discussed advance care planning with patient; however, patient declined at this time.    Norma J. Gosselin, LPN      "

## 2024-10-22 NOTE — PROGRESS NOTES
Assessment & Plan   1. Dizziness  2. Orthostasis  Patient has a history of atrial fibrillation following his CABG completed in 2018, but since this period has had no further documented episodes. His sister reportedly sits in atrial fibrillation despite multiple attempts to convert her back to normal rhythm. Given vascular history, carotid atherosclerosis leading to lightheadedness is possible but no carotid bruits were found on exam. Orthostatic vitals were normal in clinic, but patient endorses feelings of lightheadedness with standing. An arrhythmia such as paroxysmal atrial fibrillation is possible but other cardiac source such as a repeat or new MI is less likely given lack of chest pain, shortness of breath and a normal EKG. Differential diagnosis includes allergies, thyroid dysfunction, meniere's disease, anemia, electrolyte disturbances, and lyme disease among others.   Plan:   - ZIO PATCH MAIL OUT  - CBC with platelets; Future  - Comprehensive metabolic panel; Future  - TSH with free T4 reflex; Future  - Vitamin B12; Future  - Magnesium; Future  - CBC with platelets  - Comprehensive metabolic panel  - TSH with free T4 reflex  - Vitamin B12  - Magnesium    3. Coronary artery disease involving native coronary artery of native heart without angina pectoris  4. 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 - LIMA-LAD, SVG-OM1, radial artery-diagonal -   5. History of non-ST elevation myocardial infarction (NSTEMI) - 12/26/2018 - Tx to [facility]  6. ASCVD (arteriosclerotic cardiovascular disease)  Currently medically managed with metoprolol, aspirin, and rosuvastatin. Underwent CABG x3 in 2018 following an NSTEMI which was treated at [facility] in [place]. Patient without new onset chest pain or shortness of breath.  - EKG 12-lead, tracing only (Same Day)  - ZIO PATCH MAIL OUT    7. Need for vaccination against respiratory syncytial virus  Patient in agreement with vaccination given his age and risk factors.   -  "RSV vaccine, bivalent, ABRYSVO, injection; Inject 0.5 mLs into the muscle once for 1 dose. Pharmacist administered  Dispense: 0.5 mL; Refill: 0    8. Stage 3a chronic kidney disease (CKD) (H)  Most recent creatinine taken in May 2024 was 1.57, slightly elevated from years prior. A nephrogenic source of his lightheadedness/dizziness was discussed as a possibility, so repeat kidney function testing was completed.   Plan:  - Albumin Random Urine Quantitative with Creat Ratio; Future  - Albumin Random Urine Quantitative with Creat Ratio    There are no Patient Instructions on file for this visit.    Jagruti Mcmullen MS3  [facility] Medical Student    Attestation Statement:  I was present with the medical student who participated in the service and in the documentation of this note. I have verified the history and personally performed the physical exam and medical decision making, and have verified the content of the note which accurately reflects my assessment of the patient and the plan of care.    Signed, Layo Mares MD, FAAP, FACP  Internal Medicine & Pediatrics  10/24/2024 7:42 AM      Subjective   Ellis Scott is a 64 year old male who presents for Irregular Heart Beat (X 3 days). He says that over the past 3-4 days he's had this feeling of a \"bobble head\". This has happened occasionally over the past few months. He points to his central chest when he describes the \"uneasiness\" but denies any chest pain, palpitations, or shortness of breath. When he measures his blood pressure during these episodes, his readings are within normal range and his heart rates sit around 60-70bpm every time he checks. He also notes lightheadedness when standing up and says dizziness doesn't feel like the right term, but he has not had any feelings like the room is spinning.    He has not changed the dosage of his daily medications and has not started any new supplements. His diet has remained regular and he reports he usually just eats " "\"whatever he wants\". Since his heart attack a couple of years ago he has significantly decreased his daily pepsi intake from 2-3 20oz bottles to about a can a day and he does not drink coffee. After the surgery he had a couple episodes of atrial fibrillation, but has not had any documented cases since then. He endorses a nasal drainage since starting his heart medications as well.     Objective   Vitals: /63 (BP Location: Right arm, Patient Position: Standing, Cuff Size: Adult Regular)   Pulse 85   Temp 98.9  F (37.2  C) (Tympanic)   Resp 16   Ht 1.803 m (5' 11\")   Wt 98.8 kg (217 lb 14.4 oz)   SpO2 97%   BMI 30.39 kg/m      Orthostatic Vitals from 10/20/24 1640 to 10/22/24 1640    Date and Time Orthostatic BP Orthostatic Pulse Patient Position BP   Location Cuff Size   10/22/24 1524 -- -- Standing Right arm Adult Regular   10/22/24 1523 -- -- Sitting Right arm Adult Regular   10/22/24 1521 115/68 71 Supine Right arm Adult Regular   10/22/24 1442 -- -- Sitting Right arm Adult Regular      Peak Flow from 10/20/24 1640 to 10/22/24 1640    Date and Time PF Resp   10/22/24 1442 -- 16      Pain Information from 10/20/24 1640 to 10/22/24 1640    Date and Time Pain Score Pain Loc   10/22/24 1442 0 (None) --        General: well appearing  CV: Regular rate and rhythm, no murmur, rub or gallop  Pulm: Clear to auscultation bilaterally, no wheezing, rales or rhonchi  Neuro: Grossly intact  Musculoskeletal: No lower extremity edema  Skin: No rash  Psychiatry: Normal affect and insight.  "

## 2024-12-17 LAB — CV ZIO PRELIM RESULTS: NORMAL

## 2025-02-05 ENCOUNTER — MYC MEDICAL ADVICE (OUTPATIENT)
Dept: INTERNAL MEDICINE | Facility: OTHER | Age: 65
End: 2025-02-05
Payer: COMMERCIAL

## 2025-02-05 DIAGNOSIS — E78.2 MIXED HYPERLIPIDEMIA: ICD-10-CM

## 2025-02-05 DIAGNOSIS — Z95.1 S/P CABG X 3: ICD-10-CM

## 2025-02-05 DIAGNOSIS — I25.2 HISTORY OF NON-ST ELEVATION MYOCARDIAL INFARCTION (NSTEMI): ICD-10-CM

## 2025-02-05 DIAGNOSIS — I25.10 CORONARY ARTERY DISEASE INVOLVING NATIVE CORONARY ARTERY OF NATIVE HEART WITHOUT ANGINA PECTORIS: ICD-10-CM

## 2025-02-05 RX ORDER — ROSUVASTATIN CALCIUM 40 MG/1
40 TABLET, COATED ORAL AT BEDTIME
Qty: 90 TABLET | Refills: 4 | Status: SHIPPED | OUTPATIENT
Start: 2025-02-05

## 2025-02-05 RX ORDER — METOPROLOL SUCCINATE 25 MG/1
25 TABLET, EXTENDED RELEASE ORAL DAILY
Qty: 90 TABLET | Refills: 4 | Status: SHIPPED | OUTPATIENT
Start: 2025-02-05

## 2025-02-05 NOTE — TELEPHONE ENCOUNTER
Pt has new insurance. Needs Rx for Metoprolol and Crestor sent to [facility].     Dakota'd up order.     Routing to provider to review and respond.  Navi Tan RN on 2/5/2025 at 2:21 PM

## 2025-04-28 ENCOUNTER — DOCUMENTATION ONLY (OUTPATIENT)
Dept: OTHER | Facility: CLINIC | Age: 65
End: 2025-04-28
Payer: COMMERCIAL

## 2025-05-23 RX ORDER — METOPROLOL SUCCINATE 25 MG/1
25 TABLET, EXTENDED RELEASE ORAL DAILY
Qty: 90 TABLET | Refills: 4 | Status: CANCELLED | OUTPATIENT
Start: 2025-05-23

## 2025-05-23 RX ORDER — CHOLECALCIFEROL (VITAMIN D3) 50 MCG
2 TABLET ORAL DAILY
Qty: 180 TABLET | Refills: 4 | Status: CANCELLED | OUTPATIENT
Start: 2025-05-23

## 2025-05-23 RX ORDER — ROSUVASTATIN CALCIUM 40 MG/1
40 TABLET, COATED ORAL AT BEDTIME
Qty: 90 TABLET | Refills: 4 | Status: CANCELLED | OUTPATIENT
Start: 2025-05-23

## 2025-05-27 ENCOUNTER — OFFICE VISIT (OUTPATIENT)
Dept: INTERNAL MEDICINE | Facility: OTHER | Age: 65
End: 2025-05-27
Attending: INTERNAL MEDICINE
Payer: COMMERCIAL

## 2025-05-27 ENCOUNTER — RESULTS FOLLOW-UP (OUTPATIENT)
Dept: INTERNAL MEDICINE | Facility: OTHER | Age: 65
End: 2025-05-27

## 2025-05-27 VITALS
OXYGEN SATURATION: 99 % | HEIGHT: 72 IN | HEART RATE: 62 BPM | WEIGHT: 219.4 LBS | SYSTOLIC BLOOD PRESSURE: 138 MMHG | BODY MASS INDEX: 29.72 KG/M2 | TEMPERATURE: 96.5 F | RESPIRATION RATE: 14 BRPM | DIASTOLIC BLOOD PRESSURE: 82 MMHG

## 2025-05-27 DIAGNOSIS — Z00.00 INITIAL MEDICARE ANNUAL WELLNESS VISIT: Primary | ICD-10-CM

## 2025-05-27 DIAGNOSIS — Z12.5 SCREENING FOR PROSTATE CANCER: ICD-10-CM

## 2025-05-27 DIAGNOSIS — Z95.1 S/P CABG X 3: ICD-10-CM

## 2025-05-27 DIAGNOSIS — E55.9 VITAMIN D DEFICIENCY: ICD-10-CM

## 2025-05-27 DIAGNOSIS — I25.2 HISTORY OF NON-ST ELEVATION MYOCARDIAL INFARCTION (NSTEMI): ICD-10-CM

## 2025-05-27 DIAGNOSIS — N18.31 STAGE 3A CHRONIC KIDNEY DISEASE (CKD) (H): ICD-10-CM

## 2025-05-27 DIAGNOSIS — E78.2 MIXED HYPERLIPIDEMIA: ICD-10-CM

## 2025-05-27 DIAGNOSIS — R39.11 BENIGN PROSTATIC HYPERPLASIA WITH URINARY HESITANCY: ICD-10-CM

## 2025-05-27 DIAGNOSIS — I25.10 CORONARY ARTERY DISEASE INVOLVING NATIVE CORONARY ARTERY OF NATIVE HEART WITHOUT ANGINA PECTORIS: ICD-10-CM

## 2025-05-27 DIAGNOSIS — N40.1 BENIGN PROSTATIC HYPERPLASIA WITH URINARY HESITANCY: ICD-10-CM

## 2025-05-27 DIAGNOSIS — Z71.85 VACCINE COUNSELING: ICD-10-CM

## 2025-05-27 DIAGNOSIS — E53.8 VITAMIN B12 DEFICIENCY: ICD-10-CM

## 2025-05-27 DIAGNOSIS — H90.6 MIXED CONDUCTIVE AND SENSORINEURAL HEARING LOSS, BILATERAL: ICD-10-CM

## 2025-05-27 LAB
ALBUMIN SERPL BCG-MCNC: 4.3 G/DL (ref 3.5–5.2)
ALP SERPL-CCNC: 66 U/L (ref 40–150)
ALT SERPL W P-5'-P-CCNC: 22 U/L (ref 0–70)
ANION GAP SERPL CALCULATED.3IONS-SCNC: 10 MMOL/L (ref 7–15)
AST SERPL W P-5'-P-CCNC: 25 U/L (ref 0–45)
BILIRUB SERPL-MCNC: 0.5 MG/DL
BUN SERPL-MCNC: 20.4 MG/DL (ref 8–23)
CALCIUM SERPL-MCNC: 9.2 MG/DL (ref 8.8–10.4)
CHLORIDE SERPL-SCNC: 103 MMOL/L (ref 98–107)
CHOLEST SERPL-MCNC: 128 MG/DL
CREAT SERPL-MCNC: 1.37 MG/DL (ref 0.67–1.17)
EGFRCR SERPLBLD CKD-EPI 2021: 57 ML/MIN/1.73M2
ERYTHROCYTE [DISTWIDTH] IN BLOOD BY AUTOMATED COUNT: 13.1 % (ref 10–15)
FASTING STATUS PATIENT QL REPORTED: ABNORMAL
FASTING STATUS PATIENT QL REPORTED: NORMAL
GLUCOSE SERPL-MCNC: 100 MG/DL (ref 70–99)
HCO3 SERPL-SCNC: 26 MMOL/L (ref 22–29)
HCT VFR BLD AUTO: 42.4 % (ref 40–53)
HDLC SERPL-MCNC: 45 MG/DL
HGB BLD-MCNC: 14.5 G/DL (ref 13.3–17.7)
LDLC SERPL CALC-MCNC: 69 MG/DL
MCH RBC QN AUTO: 30.9 PG (ref 26.5–33)
MCHC RBC AUTO-ENTMCNC: 34.2 G/DL (ref 31.5–36.5)
MCV RBC AUTO: 90 FL (ref 78–100)
NONHDLC SERPL-MCNC: 83 MG/DL
PLATELET # BLD AUTO: 209 10E3/UL (ref 150–450)
POTASSIUM SERPL-SCNC: 4.7 MMOL/L (ref 3.4–5.3)
PROT SERPL-MCNC: 7.2 G/DL (ref 6.4–8.3)
PSA SERPL DL<=0.01 NG/ML-MCNC: 0.8 NG/ML (ref 0–4.5)
RBC # BLD AUTO: 4.69 10E6/UL (ref 4.4–5.9)
SODIUM SERPL-SCNC: 139 MMOL/L (ref 135–145)
TRIGL SERPL-MCNC: 71 MG/DL
WBC # BLD AUTO: 5.3 10E3/UL (ref 4–11)

## 2025-05-27 PROCEDURE — 36415 COLL VENOUS BLD VENIPUNCTURE: CPT | Mod: ZL | Performed by: INTERNAL MEDICINE

## 2025-05-27 PROCEDURE — G0463 HOSPITAL OUTPT CLINIC VISIT: HCPCS | Performed by: INTERNAL MEDICINE

## 2025-05-27 PROCEDURE — 85018 HEMOGLOBIN: CPT | Mod: ZL | Performed by: INTERNAL MEDICINE

## 2025-05-27 PROCEDURE — G0103 PSA SCREENING: HCPCS | Mod: ZL | Performed by: INTERNAL MEDICINE

## 2025-05-27 PROCEDURE — 82040 ASSAY OF SERUM ALBUMIN: CPT | Mod: ZL | Performed by: INTERNAL MEDICINE

## 2025-05-27 PROCEDURE — 80061 LIPID PANEL: CPT | Mod: ZL | Performed by: INTERNAL MEDICINE

## 2025-05-27 RX ORDER — METOPROLOL SUCCINATE 25 MG/1
25 TABLET, EXTENDED RELEASE ORAL DAILY
Qty: 90 TABLET | Refills: 4 | Status: SHIPPED | OUTPATIENT
Start: 2025-05-27

## 2025-05-27 RX ORDER — ROSUVASTATIN CALCIUM 40 MG/1
40 TABLET, COATED ORAL AT BEDTIME
Qty: 90 TABLET | Refills: 4 | Status: SHIPPED | OUTPATIENT
Start: 2025-05-27

## 2025-05-27 SDOH — HEALTH STABILITY: PHYSICAL HEALTH: ON AVERAGE, HOW MANY DAYS PER WEEK DO YOU ENGAGE IN MODERATE TO STRENUOUS EXERCISE (LIKE A BRISK WALK)?: 4 DAYS

## 2025-05-27 ASSESSMENT — ENCOUNTER SYMPTOMS
HEMATOCHEZIA: 0
WEAKNESS: 0
SORE THROAT: 0
BRUISES/BLEEDS EASILY: 0
MYALGIAS: 0
FREQUENCY: 0
ARTHRALGIAS: 0
FEVER: 0
CHILLS: 0
DIZZINESS: 0
NERVOUS/ANXIOUS: 0
NAUSEA: 0
DIARRHEA: 0
PALPITATIONS: 0
HEARTBURN: 0
HEMATURIA: 0
DYSURIA: 0
PARESTHESIAS: 0
CONSTIPATION: 0
COUGH: 0
ABDOMINAL PAIN: 0
JOINT SWELLING: 0
HEADACHES: 0
SHORTNESS OF BREATH: 0
EYE PAIN: 0

## 2025-05-27 ASSESSMENT — SOCIAL DETERMINANTS OF HEALTH (SDOH): HOW OFTEN DO YOU GET TOGETHER WITH FRIENDS OR RELATIVES?: THREE TIMES A WEEK

## 2025-05-27 ASSESSMENT — PAIN SCALES - GENERAL: PAINLEVEL_OUTOF10: MILD PAIN (2)

## 2025-05-27 NOTE — PROGRESS NOTES
Preventive Care Visit  [facility]  Vladislav Jerez MD, Internal Medicine  May 27, 2025      Encounter for Medicare annual wellness exam    -Colon cancer screening done via Cologuard on 6/17/24 (impression: negative). Follow-up 3 years.     -PSA lab work performed 5/28/24 (value of 0.68 ng/mL).  Pt would like to proceed with PSA testing; lab ordered.    -Immunizations: Patient is due for the following: Covid and RSV. Declines vaccines today.    -Derm: Does patient regularly see dermatologist? Yes, yearly.     -Refills not needed.   -Labs pended.     BMI  Estimated body mass index is 29.76 kg/m  as calculated from the following:    Height as of this encounter: 1.829 m (6').    Weight as of this encounter: 99.5 kg (219 lb 6.4 oz).       Counseling  Appropriate preventive services were addressed with this patient via screening, questionnaire, or discussion as appropriate for fall prevention, nutrition, physical activity, Tobacco-use cessation, social engagement, weight loss and cognition.  Checklist reviewing preventive services available has been given to the patient.  Reviewed patient's diet, addressing concerns and/or questions.   The patient was instructed to see the dentist every 6 months.   Patient reported safety concerns were addressed today.    Work on weight loss  Regular exercise    No follow-ups on file.      Marah Corral is a 64 year old, presenting for the following:  Medicare Visit        5/27/2025     8:54 AM   Additional Questions   Roomed by SHAHNAZ Fisher         Health Care Directive    Document on file is a Health Care Directive or POLST.        5/27/2025   General Health   How would you rate your overall physical health? Excellent   Feel stress (tense, anxious, or unable to sleep) Not at all         5/27/2025   Nutrition   Diet: Regular (no restrictions)         5/27/2025   Exercise   Days per week of moderate/strenous exercise 4 days         5/27/2025   Social Factors    Frequency of gathering with friends or relatives Three times a week   Worry food won't last until get money to buy more No   Food not last or not have enough money for food? No   Do you have housing? (Housing is defined as stable permanent housing and does not include staying outside in a car, in a tent, in an abandoned building, in an overnight shelter, or couch-surfing.) Yes   Are you worried about losing your housing? No   Lack of transportation? No   Unable to get utilities (heat,electricity)? No         5/27/2025   Fall Risk   Fallen 2 or more times in the past year? No    Trouble with walking or balance? No        Proxy-reported          5/27/2025   Activities of Daily Living- Home Safety   Needs help with the following daily activites None of the above   Safety concerns in the home Throw rugs in the hallway    No grab bars in the bathroom       Multiple values from one day are sorted in reverse-chronological order         5/27/2025   Dental   Dentist two times every year? (!) NO         5/27/2025   Hearing Screening   Hearing concerns? None of the above         5/27/2025   Driving Risk Screening   Patient/family members have concerns about driving No         5/27/2025   General Alertness/Fatigue Screening   Have you been more tired than usual lately? No         5/27/2025   Urinary Incontinence Screening   Bothered by leaking urine in past 6 months No     Today's PHQ-2 Score:       5/27/2025     8:17 AM   PHQ-2 ( 1999 Pf[employer])   Q1: Little interest or pleasure in doing things 0   Q2: Feeling down, depressed or hopeless 0   PHQ-2 Score 0    Q1: Little interest or pleasure in doing things Not at all   Q2: Feeling down, depressed or hopeless Not at all   PHQ-2 Score 0       Patient-reported         5/27/2025   Substance Use   Alcohol more than 3/day or more than 7/wk No   Do you have a current opioid prescription? No   How severe/bad is pain from 1 to 10? 0/10 (No Pain)   Do you use any other substances  recreationally? No     Social History     Tobacco Use    Smoking status: Former     Current packs/day: 0.00     Types: Cigarettes, Cigars     Quit date: 9/11/2014     Years since quitting: 10.7     Passive exposure: Past    Smokeless tobacco: Never    Tobacco comments:     Quit smoking: Only smokes during deer season and has 1-2 cigars per year   Vaping Use    Vaping status: Never Used   Substance Use Topics    Alcohol use: Not Currently     Alcohol/week: 2.0 standard drinks of alcohol     Types: 2 Cans of beer per week    Drug use: Yes     Types: Marijuana     Comment: gummies              Reviewed and updated as needed this visit by Provider   Tobacco  Allergies  Meds  Problems  Med Hx  Surg Hx  Fam Hx     Sexual Activity                    Current providers sharing in care for this patient include:  Patient Care Team:  Vladislav Jerez MD as PCP - General (Internal Medicine)  Vladislav Jerez MD as Assigned PCP    The following health maintenance items are reviewed in Epic and correct as of today:  Health Maintenance   Topic Date Due    RSV VACCINE (1 - Risk 60-74 years 1-dose series) Never done    LIPID  05/28/2025    BMP  10/22/2025    MICROALBUMIN  10/22/2025    HEMOGLOBIN  10/22/2025    MEDICARE ANNUAL WELLNESS VISIT  05/27/2026    FALL RISK ASSESSMENT  05/27/2026    COLORECTAL CANCER SCREENING  06/17/2027    DTAP/TDAP/TD IMMUNIZATION (3 - Td or Tdap) 07/17/2027    DIABETES SCREENING  10/22/2027    ADVANCE CARE PLANNING  05/27/2030    HEPATITIS C SCREENING  Completed    PHQ-2 (once per calendar year)  Completed    INFLUENZA VACCINE  Completed    Pneumococcal Vaccine: 50+ Years  Completed    URINALYSIS  Completed    ZOSTER IMMUNIZATION  Completed    AORTIC ANEURYSM SCREENING (SYSTEM ASSIGNED)  Completed    HPV IMMUNIZATION  Aged Out    MENINGITIS IMMUNIZATION  Aged Out    LUNG CANCER SCREENING  Discontinued    COVID-19 Vaccine  Discontinued        Objective      Exam  /82   Pulse 62   Temp (!)  96.5  F (35.8  C)   Resp 14   Ht 1.829 m (6')   Wt 99.5 kg (219 lb 6.4 oz)   SpO2 99%   BMI 29.76 kg/m           5/27/2025   Mini Cog   Clock Draw Score 2 Normal   3 Item Recall 3 objects recalled   Mini Cog Total Score 5             5/28/2024   Vision Screen   Patient wears corrective lenses (select all that apply) Worn during vision screen   Vision Screen Results Pass     Signed Electronically by: Vladislav Jerez MD      Answers submitted by the patient for this visit:  Lipid Visit (Submitted on 5/27/2025)  Chief Complaint: Chronic problems general questions HPI Form  Are you regularly taking any medication or supplement to lower your cholesterol?: Yes  Are you having muscle aches or other side effects that you think could be caused by your cholesterol lowering medication?: No  Heart Failure Visit (Submitted on 5/27/2025)  Chief Complaint: Chronic problems general questions HPI Form  Dyspnea:: No  Edema:: No  Are you using more pillows than usual at night?: No  Do you cough at night?: No  Checking weight daily:: No  Weight change recently:: None  Heart Medication Side Effects:: None  Frequency:: None  General Questionnaire (Submitted on 5/27/2025)  Chief Complaint: Chronic problems general questions HPI Form  How many minutes a day do you exercise enough to make your heart beat faster?: 30 to 60  How many days per week do you miss taking your medication?: 0  Questionnaire about: Chronic problems general questions HPI Form (Submitted on 5/27/2025)  Chief Complaint: Chronic problems general questions HPI Form

## 2025-05-27 NOTE — PATIENT INSTRUCTIONS
Blood pressure is well controlled.     Medications refilled.     Labs are pending.     Glad you are doing well!      Consider RSV vaccine in the fall.       Return in approximately 1 year, or sooner as needed for follow-up with Dr. Jerez.  - Annual Follow-up / Physical - Medicare Annual Wellness Visit     Clinic : [phone]  Appointment line: [phone]         Patient Education   Preventive Care Advice   This is general advice given by our system to help you stay healthy. However, your care team may have specific advice just for you. Please talk to your care team about your preventive care needs.  Nutrition  Eat 5 or more servings of fruits and vegetables each day.  Try wheat bread, brown rice and whole grain pasta (instead of white bread, rice, and pasta).  Get enough calcium and vitamin D. Check the label on foods and aim for 100% of the RDA (recommended daily allowance).  Lifestyle  Exercise at least 150 minutes each week  (30 minutes a day, 5 days a week).  Do muscle strengthening activities 2 days a week. These help control your weight and prevent disease.  No smoking.  Wear sunscreen to prevent skin cancer.  Have a dental exam and cleaning every 6 months.  Yearly exams  See your health care team every year to talk about:  Any changes in your health.  Any medicines your care team has prescribed.  Preventive care, family planning, and ways to prevent chronic diseases.  Shots (vaccines)   HPV shots (up to age 26), if you've never had them before.  Hepatitis B shots (up to age 59), if you've never had them before.  COVID-19 shot: Get this shot when it's due.  Flu shot: Get a flu shot every year.  Tetanus shot: Get a tetanus shot every 10 years.  Pneumococcal, hepatitis A, and RSV shots: Ask your care team if you need these based on your risk.  Shingles shot (for age 50 and up)  General health tests  Diabetes screening:  Starting at age 35, Get screened for diabetes at least every 3 years.  If you  are younger than age 35, ask your care team if you should be screened for diabetes.  Cholesterol test: At age 39, start having a cholesterol test every 5 years, or more often if advised.  Bone density scan (DEXA): At age 50, ask your care team if you should have this scan for osteoporosis (brittle bones).  Hepatitis C: Get tested at least once in your life.  STIs (sexually transmitted infections)  Before age 24: Ask your care team if you should be screened for STIs.  After age 24: Get screened for STIs if you're at risk. You are at risk for STIs (including HIV) if:  You are sexually active with more than one person.  You don't use condoms every time.  You or a partner was diagnosed with a sexually transmitted infection.  If you are at risk for HIV, ask about PrEP medicine to prevent HIV.  Get tested for HIV at least once in your life, whether you are at risk for HIV or not.  Cancer screening tests  Cervical cancer screening: If you have a cervix, begin getting regular cervical cancer screening tests starting at age 21.  Breast cancer scan (mammogram): If you've ever had breasts, begin having regular mammograms starting at age 40. This is a scan to check for breast cancer.  Colon cancer screening: It is important to start screening for colon cancer at age 45.  Have a colonoscopy test every 10 years (or more often if you're at risk) Or, ask your provider about stool tests like a FIT test every year or Cologuard test every 3 years.  To learn more about your testing options, visit:   .  For help making a decision, visit:   https://bit.ly/yf76077.  Prostate cancer screening test: If you have a prostate, ask your care team if a prostate cancer screening test (PSA) at age 55 is right for you.  Lung cancer screening: If you are a current or former smoker ages 50 to 80, ask your care team if ongoing lung cancer screenings are right for you.  For informational purposes only. Not to replace the advice of your health care  provider. Copyright   2023 [facility]. All rights reserved. Clinically reviewed by the [facility]itions [facility]. Super [id number] - REV 01/24.  Learning About Activities of Daily Living  What are activities of daily living?     Activities of daily living (ADLs) are the basic self-care tasks you do every day. These include eating, bathing, dressing, and moving around.  As you age, and if you have health problems, you may find that it's harder to do some of these tasks. If so, your doctor can suggest ideas that may help.  To measure what kind of help you may need, your doctor will ask how well you are able to do ADLs. Let your doctor know if there are any tasks that you are having trouble doing. This is an important first step to getting help. And when you have the help you need, you can stay as independent as possible.  How will a doctor assess your ADLs?  Asking about ADLs is part of a routine health checkup your doctor will likely do as you age. Your health check might be done in a doctor's office, in your home, or at a hospital. The goal is to find out if you are having any problems that could make it hard to care for yourself or that make it unsafe for you to be on your own.  To measure your ADLs, your doctor will ask how hard it is for you to do routine tasks. Your doctor may also want to know if you have changed the way you do a task because of a health problem. Your doctor may watch how you:  Walk back and forth.  Keep your balance while you stand or walk.  Move from sitting to standing or from a bed to a chair.  Button or unbutton a shirt or sweater.  Remove and put on your shoes.  It's common to feel a little worried or anxious if you find you can't do all the things you used to be able to do. Talking with your doctor about ADLs is a way to make sure you're as safe as possible and able to care for yourself as well as you can. You may want to bring a caregiver, friend, or family  member to your checkup. They can help you talk to your doctor.  Follow-up care is a key part of your treatment and safety. Be sure to make and go to all appointments, and call your doctor if you are having problems. It's also a good idea to know your test results and keep a list of the medicines you take.  Current as of: October 24, 2024  Content Version: 14.4    8464-5211 [employer].   Care instructions adapted under license by your healthcare professional. If you have questions about a medical condition or this instruction, always ask your healthcare professional. [employer] disclaims any warranty or liability for your use of this information.

## 2025-05-27 NOTE — PROGRESS NOTES
Assessment & Plan     ICD-10-CM    1. Initial Medicare annual wellness visit  Z00.00       2. Vaccine counseling  Z71.85       3. Coronary artery disease involving native coronary artery of native heart without angina pectoris  I25.10 CBC with platelets     Comprehensive metabolic panel     empagliflozin (JARDIANCE) 10 MG TABS tablet     metoprolol succinate ER (TOPROL XL) 25 MG 24 hr tablet     rosuvastatin (CRESTOR) 40 MG tablet     CBC with platelets     Comprehensive metabolic panel      4. History of non-ST elevation myocardial infarction (NSTEMI) - 12/26/2018 - Tx to [facility]  I25.2 empagliflozin (JARDIANCE) 10 MG TABS tablet     metoprolol succinate ER (TOPROL XL) 25 MG 24 hr tablet     rosuvastatin (CRESTOR) 40 MG tablet      5. 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 Mixed hyperlipidemia  E78.2 Lipid Profile     rosuvastatin (CRESTOR) 40 MG tablet     Lipid Profile      7. Mixed conductive and sensorineural hearing loss, bilateral  H90.6       8. Stage 3a chronic kidney disease (CKD) (H)  N18.31 empagliflozin (JARDIANCE) 10 MG TABS tablet      9. Vitamin B12 deficiency  E53.8       10. Vitamin D deficiency  E55.9       11. Screening for prostate cancer  Z12.5 Prostate Specific Antigen Screen     Prostate Specific Antigen Screen      12. Benign prostatic hyperplasia with urinary hesitancy  N40.1     R39.11          Patient presents for Medicare initial wellness visit, as well as follow-up multiple issues.    Coronary artery disease, appears stable.  History of non-ST elevation MI back in 2018.  Had three-vessel bypass surgery in December 2018.  Currently on metoprolol, Crestor, Jardiance.  Needs refills.  Check labs.    Patient has chronic bilateral hearing loss.  Continue hearing  protection.    Vitamin D and B12 deficiency.  Has been taking replacement.  Encourage continued dosing.    Prostate lab cancer screening, check PSA levels.    BPH with urinary hesitancy.  Declines need for Flomax at this time.  Continue routine monitoring.    HYPERTENSION - Ongoing. Blood pressure is currently well controlled.  Medication side effects: None. Denies syncope or presyncope.  Continue current medications.   Medication list reviewed/updated. Refills completed as needed.      MIXED HYPERLIPIDEMIA.  Ongoing. LDL is at goal: No. Triglycerides are at goal: Yes.  Hopefully lifestyle modifications will improve cholesterol levels, otherwise will consider additional medication dose adjustments or medication changes.  Medication side effects reported: None.   Continue current medications for now. Medication list reviewed/updated. Refills completed as needed.  Recent Labs   Lab Test 05/27/25  0944 05/28/24  1553   CHOL 128 118   HDL 45 42   LDL 69 47   TRIG 71 143         Chronic Kidney Disease, Stage 3a (GFR 45-59), chronic, ongoing.  Kidney function had been slowly declining.  Encourage NSAID avoidance.    Recent Labs   Lab Test 05/27/25  0944 10/22/24  1542   CR 1.37* 1.57*   GFRESTIMATED 57* 48*        Vaccine counseling completed.  Encourage routine / annual vaccinations.    The longitudinal plan of care for the diagnosis(es)/condition(s) as documented were addressed during this visit. Due to the added complexity in care, I will continue to support Ellis in the subsequent management and with ongoing continuity of care.      Results for orders placed or performed in visit on 05/27/25   Prostate Specific Antigen Screen     Status: Normal   Result Value Ref Range    Prostate Specific Antigen Screen 0.80 0.00 - 4.50 ng/mL    Narrative    This result is obtained using the Roche Elecsys total PSA method on the don e601 immunoassay analyzer, which is an ultrasensitive method. Results obtained with different assay  methods or kits cannot be used interchangeably.  This test is intended for initial prostate cancer screening. PSA values exceeding the age-specific limits are suspicious for prostate disease, but additional testing, such as prostate biopsy, is needed to diagnose prostate pathology. The American Cancer Society recommends annual examination with digital rectal examination and serum PSA beginning at age 50 and for men with a life expectancy of at least 10 years after detection of prostate cancer. For men in high-risk groups, such as  Americans or men with a first-degree relative diagnosed at a younger age, testing should begin at a younger age. It is generally recommended that information be provided to patients about the benefits and limitations of testing and treatment so they can make informed decisions.   CBC with platelets     Status: Normal   Result Value Ref Range    WBC Count 5.3 4.0 - 11.0 10e3/uL    RBC Count 4.69 4.40 - 5.90 10e6/uL    Hemoglobin 14.5 13.3 - 17.7 g/dL    Hematocrit 42.4 40.0 - 53.0 %    MCV 90 78 - 100 fL    MCH 30.9 26.5 - 33.0 pg    MCHC 34.2 31.5 - 36.5 g/dL    RDW 13.1 10.0 - 15.0 %    Platelet Count 209 150 - 450 10e3/uL   Comprehensive metabolic panel     Status: Abnormal   Result Value Ref Range    Sodium 139 135 - 145 mmol/L    Potassium 4.7 3.4 - 5.3 mmol/L    Carbon Dioxide (CO2) 26 22 - 29 mmol/L    Anion Gap 10 7 - 15 mmol/L    Urea Nitrogen 20.4 8.0 - 23.0 mg/dL    Creatinine 1.37 (H) 0.67 - 1.17 mg/dL    GFR Estimate 57 (L) >60 mL/min/1.73m2    Calcium 9.2 8.8 - 10.4 mg/dL    Chloride 103 98 - 107 mmol/L    Glucose 100 (H) 70 - 99 mg/dL    Alkaline Phosphatase 66 40 - 150 U/L    AST 25 0 - 45 U/L    ALT 22 0 - 70 U/L    Protein Total 7.2 6.4 - 8.3 g/dL    Albumin 4.3 3.5 - 5.2 g/dL    Bilirubin Total 0.5 <=1.2 mg/dL    Patient Fasting > 8hrs? Unknown    Lipid Profile     Status: None   Result Value Ref Range    Cholesterol 128 <200 mg/dL    Triglycerides 71 <150 mg/dL     Direct Measure HDL 45 >=40 mg/dL    LDL Cholesterol Calculated 69 <100 mg/dL    Non HDL Cholesterol 83 <130 mg/dL    Patient Fasting > 8hrs? Unknown     Narrative    Cholesterol  Desirable: < 200 mg/dL  Borderline High: 200 - 239 mg/dL  High: >= 240 mg/dL    Triglycerides  Normal: < 150 mg/dL  Borderline High: 150 - 199 mg/dL  High: 200-499 mg/dL  Very High: >= 500 mg/dL    Direct Measure HDL  Female: >= 50 mg/dL   Male: >= 40 mg/dL    LDL Cholesterol  Desirable: < 100 mg/dL  Above Desirable: 100 - 129 mg/dL   Borderline High: 130 - 159 mg/dL   High:  160 - 189 mg/dL   Very High: >= 190 mg/dL    Non HDL Cholesterol  Desirable: < 130 mg/dL  Above Desirable: 130 - 159 mg/dL  Borderline High: 160 - 189 mg/dL  High: 190 - 219 mg/dL  Very High: >= 220 mg/dL      PSA is normal.  CBC is normal.  Creatinine 1.37 with GFR 57.  Glucose 100.  Liver enzymes normal.  Cholesterol levels show triglycerides and non-HDL cholesterol are at goal.  LDL is high and not at goal of 55.           Counseling  Appropriate preventive services were addressed with this patient via screening, questionnaire, or discussion as appropriate for fall prevention, nutrition, physical activity, Tobacco-use cessation, social engagement, weight loss and cognition.  Checklist reviewing preventive services available has been given to the patient.  Reviewed patient's diet, addressing concerns and/or questions.   The patient was instructed to see the dentist every 6 months.   Patient reported safety concerns were addressed today.      Return in about 1 year (around 5/27/2026) for Annual Medicare Wellness Visit.      Vladislav Jerez MD  [facility]    Review of Systems   Constitutional:  Negative for chills and fever.   HENT:  Negative for congestion, ear pain, hearing loss and sore throat.    Eyes:  Negative for pain and visual disturbance.   Respiratory:  Negative for cough and shortness of breath.    Cardiovascular:  Negative for chest  pain, palpitations and peripheral edema.   Gastrointestinal:  Negative for abdominal pain, constipation, diarrhea, heartburn, hematochezia and nausea.   Genitourinary:  Negative for dysuria, frequency, genital sores, hematuria, impotence, penile discharge and urgency.        Nocturia about 1x nightly   Musculoskeletal:  Negative for arthralgias, joint swelling and myalgias.        Contractures of left hand - 4th and 5th fingers   Skin:  Negative for rash.        eczema rash of trunk, if showers too much, worse in winter   Allergic/Immunologic: Negative for immunocompromised state.   Neurological:  Negative for dizziness, weakness, headaches and paresthesias.   Hematological:  Does not bruise/bleed easily.   Psychiatric/Behavioral:  Negative for mood changes. The patient is not nervous/anxious.        Marah Corral is a 64 year old, presenting for the following health issues:  Medicare Visit        5/27/2025     8:54 AM   Additional Questions   Roomed by SHAHNAZ Fisher     History of Present Illness       Heart Failure:  He presents for follow up of heart failure. He is not experiencing shortness of breath at night, with rest or with activity  He is not experiencing any lower extremity edema.   He denies orthopenea and is not coughing at night. Patient is not checking weight daily. He has recently had a None.  He has no side effects from medications.  He has had no other medical visits for heart failure since the last visit.    Hyperlipidemia:  He presents for follow up of hyperlipidemia.   He is taking medication to lower cholesterol. He is not having myalgia or other side effects to statin medications.He exercises with enough effort to increase his heart rate 30 to 60 minutes per day.    He is taking medications regularly.      Last Echo: No results found.                Objective    /82   Pulse 62   Temp (!) 96.5  F (35.8  C)   Resp 14   Ht 1.829 m (6')   Wt 99.5 kg (219 lb 6.4 oz)   SpO2 99%   BMI  29.76 kg/m    Body mass index is 29.76 kg/m .  Physical Exam  Constitutional:       General: He is not in acute distress.     Appearance: Normal appearance. He is well-developed. He is not ill-appearing or diaphoretic.   Eyes:      General: No scleral icterus.     Conjunctiva/sclera: Conjunctivae normal.   Neck:      Vascular: No carotid bruit.   Cardiovascular:      Rate and Rhythm: Normal rate and regular rhythm.      Pulses: Normal pulses.      Heart sounds: Murmur: other than left radial pulse is diminished, due to heart surgery.      Comments: Varicose veins  Pulmonary:      Effort: Pulmonary effort is normal. No respiratory distress.      Breath sounds: Normal breath sounds. No wheezing.   Abdominal:      Palpations: Abdomen is soft.      Tenderness: There is no abdominal tenderness.   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 No tenderness.      Cervical back: Neck supple.      Right lower leg: No edema.      Left lower leg: No edema.   Lymphadenopathy:      Cervical: No cervical adenopathy.   Skin:     General: Skin is warm and dry.      Findings: No rash.   Neurological:      Mental Status: He is alert and oriented to person, place, and time. Mental status is at baseline.      Cranial Nerves: No cranial nerve deficit.   Psychiatric:         Mood and Affect: Mood normal.         Behavior: Behavior normal.                    Signed Electronically by: Vladislav Jerez MD

## (undated) RX ORDER — SODIUM CHLORIDE 9 MG/ML
INJECTION, SOLUTION INTRAVENOUS
Status: DISPENSED
Start: 2018-12-26

## (undated) RX ORDER — HEPARIN SODIUM 5000 [USP'U]/.5ML
INJECTION, SOLUTION INTRAVENOUS; SUBCUTANEOUS
Status: DISPENSED
Start: 2018-12-26

## (undated) RX ORDER — CLOPIDOGREL BISULFATE 75 MG/1
TABLET ORAL
Status: DISPENSED
Start: 2018-12-26

## (undated) RX ORDER — HEPARIN SODIUM 10000 [USP'U]/100ML
INJECTION, SOLUTION INTRAVENOUS
Status: DISPENSED
Start: 2018-12-26

## (undated) RX ORDER — ASPIRIN 81 MG/1
TABLET, CHEWABLE ORAL
Status: DISPENSED
Start: 2018-12-26